# Patient Record
Sex: MALE | Race: WHITE | NOT HISPANIC OR LATINO | Employment: FULL TIME | URBAN - NONMETROPOLITAN AREA
[De-identification: names, ages, dates, MRNs, and addresses within clinical notes are randomized per-mention and may not be internally consistent; named-entity substitution may affect disease eponyms.]

---

## 2023-02-01 ENCOUNTER — OFFICE VISIT (OUTPATIENT)
Dept: FAMILY MEDICINE CLINIC | Facility: CLINIC | Age: 48
End: 2023-02-01
Payer: COMMERCIAL

## 2023-02-01 VITALS
DIASTOLIC BLOOD PRESSURE: 78 MMHG | SYSTOLIC BLOOD PRESSURE: 128 MMHG | RESPIRATION RATE: 16 BRPM | HEIGHT: 63 IN | WEIGHT: 139 LBS | HEART RATE: 72 BPM | TEMPERATURE: 98 F | BODY MASS INDEX: 24.63 KG/M2 | OXYGEN SATURATION: 98 %

## 2023-02-01 DIAGNOSIS — M50.20 HERNIATED DISC, CERVICAL: ICD-10-CM

## 2023-02-01 DIAGNOSIS — M50.30 DDD (DEGENERATIVE DISC DISEASE), CERVICAL: ICD-10-CM

## 2023-02-01 DIAGNOSIS — M25.511 CHRONIC PAIN OF BOTH SHOULDERS: Primary | ICD-10-CM

## 2023-02-01 DIAGNOSIS — M48.02 SPINAL STENOSIS, CERVICAL REGION: ICD-10-CM

## 2023-02-01 DIAGNOSIS — G89.29 CHRONIC PAIN OF BOTH SHOULDERS: Primary | ICD-10-CM

## 2023-02-01 DIAGNOSIS — Z00.00 ROUTINE ADULT HEALTH MAINTENANCE: ICD-10-CM

## 2023-02-01 DIAGNOSIS — E78.5 DYSLIPIDEMIA: ICD-10-CM

## 2023-02-01 DIAGNOSIS — Z12.5 PROSTATE CANCER SCREENING: ICD-10-CM

## 2023-02-01 DIAGNOSIS — K21.9 GASTROESOPHAGEAL REFLUX DISEASE, UNSPECIFIED WHETHER ESOPHAGITIS PRESENT: ICD-10-CM

## 2023-02-01 DIAGNOSIS — M25.512 CHRONIC PAIN OF BOTH SHOULDERS: Primary | ICD-10-CM

## 2023-02-01 DIAGNOSIS — Z80.0 FAMILY HISTORY OF ESOPHAGEAL CANCER: ICD-10-CM

## 2023-02-01 DIAGNOSIS — R20.2 PARESTHESIA OF BOTH HANDS: ICD-10-CM

## 2023-02-01 PROBLEM — F17.200 SMOKER: Status: ACTIVE | Noted: 2019-05-15

## 2023-02-01 PROBLEM — K52.9 COLITIS: Status: ACTIVE | Noted: 2019-08-02

## 2023-02-01 PROBLEM — Z80.42 FAMILY HISTORY OF PROSTATE CANCER: Status: ACTIVE | Noted: 2019-05-15

## 2023-02-01 PROBLEM — R29.818 SUSPECTED SLEEP APNEA: Status: ACTIVE | Noted: 2019-05-15

## 2023-02-01 PROBLEM — K82.4 GALLBLADDER POLYP: Status: ACTIVE | Noted: 2019-06-17

## 2023-02-01 PROCEDURE — 99204 OFFICE O/P NEW MOD 45 MIN: CPT | Performed by: PHYSICIAN ASSISTANT

## 2023-02-01 RX ORDER — OMEPRAZOLE 20 MG/1
20 CAPSULE, DELAYED RELEASE ORAL DAILY
Qty: 90 CAPSULE | Refills: 0 | Status: SHIPPED | OUTPATIENT
Start: 2023-02-01

## 2023-06-10 DIAGNOSIS — Z80.0 FAMILY HISTORY OF ESOPHAGEAL CANCER: ICD-10-CM

## 2023-06-13 RX ORDER — OMEPRAZOLE 20 MG/1
CAPSULE, DELAYED RELEASE ORAL
Qty: 30 CAPSULE | Refills: 0 | Status: SHIPPED | OUTPATIENT
Start: 2023-06-13

## 2023-06-13 NOTE — TELEPHONE ENCOUNTER
Patient never had labs as ordered to 2023.  Please ensure he has his fasting labs ASAP.  We have already sent him a uchoose message-please call the patient

## 2023-06-15 LAB
25(OH)D3+25(OH)D2 SERPL-MCNC: 25.8 NG/ML (ref 30–100)
ALBUMIN SERPL-MCNC: 4.2 G/DL (ref 3.5–5.2)
ALBUMIN/GLOB SERPL: 1.4 G/DL
ALP SERPL-CCNC: 100 U/L (ref 39–117)
ALT SERPL-CCNC: 22 U/L (ref 1–41)
APPEARANCE UR: CLEAR
AST SERPL-CCNC: 11 U/L (ref 1–40)
BACTERIA #/AREA URNS HPF: NORMAL /HPF
BASOPHILS # BLD AUTO: 0.03 10*3/MM3 (ref 0–0.2)
BASOPHILS NFR BLD AUTO: 0.3 % (ref 0–1.5)
BILIRUB SERPL-MCNC: 0.3 MG/DL (ref 0–1.2)
BILIRUB UR QL STRIP: NEGATIVE
BUN SERPL-MCNC: 10 MG/DL (ref 6–20)
BUN/CREAT SERPL: 9.6 (ref 7–25)
CALCIUM SERPL-MCNC: 10.1 MG/DL (ref 8.6–10.5)
CASTS URNS QL MICRO: NORMAL /LPF
CHLORIDE SERPL-SCNC: 103 MMOL/L (ref 98–107)
CHOLEST SERPL-MCNC: 183 MG/DL (ref 0–200)
CO2 SERPL-SCNC: 25.8 MMOL/L (ref 22–29)
COLOR UR: YELLOW
CREAT SERPL-MCNC: 1.04 MG/DL (ref 0.76–1.27)
EGFRCR SERPLBLD CKD-EPI 2021: 88.6 ML/MIN/1.73
EOSINOPHIL # BLD AUTO: 0.19 10*3/MM3 (ref 0–0.4)
EOSINOPHIL NFR BLD AUTO: 2 % (ref 0.3–6.2)
EPI CELLS #/AREA URNS HPF: NORMAL /HPF (ref 0–10)
ERYTHROCYTE [DISTWIDTH] IN BLOOD BY AUTOMATED COUNT: 12.3 % (ref 12.3–15.4)
GLOBULIN SER CALC-MCNC: 3.1 GM/DL
GLUCOSE SERPL-MCNC: 112 MG/DL (ref 65–99)
GLUCOSE UR QL STRIP: NEGATIVE
HCT VFR BLD AUTO: 45.1 % (ref 37.5–51)
HDLC SERPL-MCNC: 49 MG/DL (ref 40–60)
HGB BLD-MCNC: 15.1 G/DL (ref 13–17.7)
HGB UR QL STRIP: NEGATIVE
IMM GRANULOCYTES # BLD AUTO: 0.03 10*3/MM3 (ref 0–0.05)
IMM GRANULOCYTES NFR BLD AUTO: 0.3 % (ref 0–0.5)
KETONES UR QL STRIP: NEGATIVE
LDLC SERPL CALC-MCNC: 118 MG/DL (ref 0–100)
LDLC/HDLC SERPL: 2.37 {RATIO}
LEUKOCYTE ESTERASE UR QL STRIP: NEGATIVE
LYMPHOCYTES # BLD AUTO: 2.72 10*3/MM3 (ref 0.7–3.1)
LYMPHOCYTES NFR BLD AUTO: 27.9 % (ref 19.6–45.3)
MCH RBC QN AUTO: 30.6 PG (ref 26.6–33)
MCHC RBC AUTO-ENTMCNC: 33.5 G/DL (ref 31.5–35.7)
MCV RBC AUTO: 91.5 FL (ref 79–97)
MICRO URNS: NORMAL
MICRO URNS: NORMAL
MONOCYTES # BLD AUTO: 0.8 10*3/MM3 (ref 0.1–0.9)
MONOCYTES NFR BLD AUTO: 8.2 % (ref 5–12)
NEUTROPHILS # BLD AUTO: 5.97 10*3/MM3 (ref 1.7–7)
NEUTROPHILS NFR BLD AUTO: 61.3 % (ref 42.7–76)
NITRITE UR QL STRIP: NEGATIVE
NRBC BLD AUTO-RTO: 0 /100 WBC (ref 0–0.2)
PH UR STRIP: 6.5 [PH] (ref 5–7.5)
PLATELET # BLD AUTO: 524 10*3/MM3 (ref 140–450)
POTASSIUM SERPL-SCNC: 4.6 MMOL/L (ref 3.5–5.2)
PROT SERPL-MCNC: 7.3 G/DL (ref 6–8.5)
PROT UR QL STRIP: NEGATIVE
PSA SERPL-MCNC: 1.34 NG/ML (ref 0–4)
RBC # BLD AUTO: 4.93 10*6/MM3 (ref 4.14–5.8)
RBC #/AREA URNS HPF: NORMAL /HPF (ref 0–2)
SODIUM SERPL-SCNC: 139 MMOL/L (ref 136–145)
SP GR UR STRIP: 1.01 (ref 1–1.03)
TRIGL SERPL-MCNC: 89 MG/DL (ref 0–150)
TSH SERPL DL<=0.005 MIU/L-ACNC: 2.35 UIU/ML (ref 0.27–4.2)
URINALYSIS REFLEX: NORMAL
UROBILINOGEN UR STRIP-MCNC: 0.2 MG/DL (ref 0.2–1)
VLDLC SERPL CALC-MCNC: 16 MG/DL (ref 5–40)
WBC # BLD AUTO: 9.74 10*3/MM3 (ref 3.4–10.8)
WBC #/AREA URNS HPF: NORMAL /HPF (ref 0–5)

## 2023-08-18 ENCOUNTER — TELEPHONE (OUTPATIENT)
Dept: FAMILY MEDICINE CLINIC | Facility: CLINIC | Age: 48
End: 2023-08-18
Payer: COMMERCIAL

## 2023-08-18 DIAGNOSIS — Z80.0 FAMILY HISTORY OF ESOPHAGEAL CANCER: ICD-10-CM

## 2023-08-18 RX ORDER — OMEPRAZOLE 20 MG/1
20 CAPSULE, DELAYED RELEASE ORAL DAILY
Qty: 30 CAPSULE | Refills: 0 | Status: SHIPPED | OUTPATIENT
Start: 2023-08-18

## 2023-08-18 NOTE — TELEPHONE ENCOUNTER
Caller: tanya manzano    Relationship: Emergency Contact    Best call back number: [phone]    Requested Prescriptions:   Requested Prescriptions     Pending Prescriptions Disp Refills    omeprazole (priLOSEC) 20 MG capsule 90 capsule 0     Sig: Take 1 capsule by mouth Daily.        Pharm[facility] be sent: [id number] - [place], [place] - [facility] [address] - [phone]  - [phone] FX     Last office visit with prescribing clinician: 2/1/2023   Last telemedicine visit with prescribing clinician: Visit date not found   Next office visit with prescribing clinician: 9/12/2023     Additional details provided by patient:PATIENT COMPLETELY OUT    Does the patient have less than a 3 day supply:  [x] Yes  [] No    Would you like a call back once the refill request has been completed: [] Yes [x] No    If the office needs to give you a call back, can they leave a voicemail: [] Yes [x] No    Magnolia Curtis Rep   08/18/23 10:33 EDT

## 2023-09-12 ENCOUNTER — OFFICE VISIT (OUTPATIENT)
Dept: FAMILY MEDICINE CLINIC | Facility: CLINIC | Age: 48
End: 2023-09-12
Payer: COMMERCIAL

## 2023-09-12 VITALS
DIASTOLIC BLOOD PRESSURE: 60 MMHG | TEMPERATURE: 98.9 F | HEART RATE: 75 BPM | WEIGHT: 139.8 LBS | BODY MASS INDEX: 24.77 KG/M2 | RESPIRATION RATE: 14 BRPM | OXYGEN SATURATION: 100 % | SYSTOLIC BLOOD PRESSURE: 110 MMHG | HEIGHT: 63 IN

## 2023-09-12 DIAGNOSIS — Z80.0 FAMILY HISTORY OF ESOPHAGEAL CANCER: ICD-10-CM

## 2023-09-12 DIAGNOSIS — M25.511 CHRONIC PAIN OF BOTH SHOULDERS: ICD-10-CM

## 2023-09-12 DIAGNOSIS — R20.2 PARESTHESIA OF BOTH HANDS: ICD-10-CM

## 2023-09-12 DIAGNOSIS — M50.20 HERNIATED DISC, CERVICAL: ICD-10-CM

## 2023-09-12 DIAGNOSIS — M25.512 CHRONIC PAIN OF BOTH SHOULDERS: ICD-10-CM

## 2023-09-12 DIAGNOSIS — M50.30 DDD (DEGENERATIVE DISC DISEASE), CERVICAL: ICD-10-CM

## 2023-09-12 DIAGNOSIS — E78.2 MIXED HYPERLIPIDEMIA: ICD-10-CM

## 2023-09-12 DIAGNOSIS — R73.01 ELEVATED FASTING GLUCOSE: ICD-10-CM

## 2023-09-12 DIAGNOSIS — K21.9 GASTROESOPHAGEAL REFLUX DISEASE, UNSPECIFIED WHETHER ESOPHAGITIS PRESENT: Primary | ICD-10-CM

## 2023-09-12 DIAGNOSIS — Z11.59 ENCOUNTER FOR HEPATITIS C SCREENING TEST FOR LOW RISK PATIENT: ICD-10-CM

## 2023-09-12 DIAGNOSIS — G89.29 CHRONIC PAIN OF BOTH SHOULDERS: ICD-10-CM

## 2023-09-12 DIAGNOSIS — E55.9 VITAMIN D DEFICIENCY: ICD-10-CM

## 2023-09-12 DIAGNOSIS — M48.02 SPINAL STENOSIS, CERVICAL REGION: ICD-10-CM

## 2023-09-12 DIAGNOSIS — D75.839 THROMBOCYTOSIS: ICD-10-CM

## 2023-09-12 PROCEDURE — 99214 OFFICE O/P EST MOD 30 MIN: CPT | Performed by: PHYSICIAN ASSISTANT

## 2023-09-12 RX ORDER — OMEPRAZOLE 20 MG/1
20 CAPSULE, DELAYED RELEASE ORAL DAILY
Qty: 90 CAPSULE | Refills: 1 | Status: SHIPPED | OUTPATIENT
Start: 2023-09-12

## 2023-09-12 NOTE — PROGRESS NOTES
Subjective   Tu Parikh is a 48 y.o. male who presents today in follow up of initial visit, hyperlipidemia, elevated fasting glucose, vitamin D deficiency, thrombocytosis, GERD, family history of esophageal cancer, neck and shoulder pain.     Heartburn     Mixed hyperlipidemia- on no medication  Elevated fasting glucose- normal A1C 2/2022  Vitamin D deficiency- Taking  vitamin D- not sure the dosage- taking daily.   Thrombocytosis-   Leukocytosis- WBC increased at 11.07 2/2022.  Otherwise labs were normal.    GERD- taking Prilosec and watching spicy foods. No breakthrough symptoms  TUMS- takes couple times weekly. Has vomiting with GERD. He has burning from stomach to throat. Brother advised he may need to check. Has taken Prilosec in the past. No prescription.   History of colon polyps- Last colonoscopy- 9/5/2019- pseudopolyps/ polyps following colitis. Advised recheck 10 years.     Neck pain-   Previous PCP SIVAN Mckeon with last appointment 2/2022 for cervical radiculopathy.  She was seen for CPE and reported chronic neck pain with new onset bilateral hand numbness and tingling.  She ordered labs and advised diet and exercise.    With no improvement, he underwent MRI cervical spine 3/2022 at [facility] with degenerative disc disease-mild narrowing left C3-4 neural foramen, mild narrowing of the C4-5 neural foramen, disc herniation with spinal stenosis and narrowing of the left neural foramen at C5-6 with circumferential effacement around CSF around the cord as well as flattening and deforming anterior surface of the cord on the left and disc herniation with spinal stenosis and narrowing of the left neural foramen at C6-7.  Circumferential effacement of CSF around the cord as well as flattening in deforming of the anterior surface of the cord C6-7.  He was having shoulder pain and thought he was to have imaging on shoulders but had on neck. He never got results from the office about MRI and got a call to  schedule with neurosurgery.     Shoulder pain- has been ok.   He was having shoulder pain- intermittent- a few times weekly- happens every couple days. Anterior shoulder, shoulder joint, and superior shoulder. Previously MRI with partial rotator cuff tear and was told it would heal and no intervention necessary. Not sure if left or right shoulder- thinks left. Aching, stabbing, throbbing and burning pain- ranges about 4/10. Abduction worsens. Sometimes prevents from sleeping. Still intermittent numbness and tingling in bilateral hands. No pain, numbness, or tingling in arms. Not dropping things and no weakness.    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     Has been healthy.   Father with cancer- Prostate and thyroid cancer.   Brother with cancer- esophageal cancer from untreated GERD- Dx at 54 years- dx a couple months ago and went through radiation and is in a trial. He could have been on chemo as well.   PGM-  of lung cancer  PGF- no cancer.   Mother- healthy.   MGF-  of emphysema and thinks lung cancer  MGM-  of old age.      The following portions of the patient's history were reviewed and updated as appropriate: allergies, current medications, past family history, past medical history, past social history, past surgical history and problem list.    Review of Systems    Objective   Vitals:    23 0816   BP: 110/60   Pulse: 75   Resp: 14   Temp: 98.9 °F (37.2 °C)   SpO2: 100%     Body mass index is 24.77 kg/m².    Physical Exam  Vitals and nursing note reviewed.   Constitutional:       General: He is not in acute distress.     Appearance: Normal appearance. He is well-developed.   HENT:      Head: Normocephalic and atraumatic.      Right Ear: External ear normal.      Left Ear: External ear normal.   Eyes:      Conjunctiva/sclera: Conjunctivae normal.   Neck:      Thyroid: No thyroid mass or thyromegaly.      Vascular: No carotid bruit.      Trachea: No tracheal deviation.    Cardiovascular:      Rate and Rhythm: Normal rate and regular rhythm.      Heart sounds: Normal heart sounds.   Pulmonary:      Effort: Pulmonary effort is normal.      Breath sounds: Normal breath sounds.   Musculoskeletal:      Right shoulder: No tenderness. Normal range of motion. Normal strength. Normal pulse.      Left shoulder: No tenderness. Normal range of motion. Normal strength. Normal pulse.      Right upper arm: No edema or tenderness.      Left upper arm: No edema or tenderness.      Right elbow: No swelling or effusion. Normal range of motion. No tenderness.      Left elbow: No swelling or effusion. Normal range of motion. No tenderness.      Right forearm: No swelling, deformity or tenderness.      Left forearm: No swelling, deformity or tenderness.      Right hand: Normal strength. Normal sensation.      Left hand: Normal strength. Normal sensation.      Cervical back: No deformity, lacerations, spasms or tenderness.   Skin:     General: Skin is warm and dry.   Neurological:      Mental Status: He is alert and oriented to person, place, and time.      Sensory: No sensory deficit.      Motor: No atrophy or abnormal muscle tone.      Gait: Gait normal.      Deep Tendon Reflexes:      Reflex Scores:       Bicep reflexes are 2+ on the right side and 2+ on the left side.       Brachioradialis reflexes are 2+ on the right side and 2+ on the left side.  Psychiatric:         Speech: Speech normal.         Behavior: Behavior normal.         Thought Content: Thought content normal.         Judgment: Judgment normal.       Assessment & Plan   Diagnoses and all orders for this visit:    1. Gastroesophageal reflux disease, unspecified whether esophagitis present (Primary)  -     omeprazole (priLOSEC) 20 MG capsule; Take 1 capsule by mouth Daily.  Dispense: 90 capsule; Refill: 1    2. Family history of esophageal cancer  -     omeprazole (priLOSEC) 20 MG capsule; Take 1 capsule by mouth Daily.  Dispense: 90  capsule; Refill: 1    3. Mixed hyperlipidemia  -     Comprehensive Metabolic Panel  -     Lipid Panel With LDL / HDL Ratio    4. Vitamin D deficiency  -     Comprehensive Metabolic Panel  -     Vitamin D,25-Hydroxy    5. Thrombocytosis  -     CBC & Differential    6. Elevated fasting glucose  -     Comprehensive Metabolic Panel  -     Hemoglobin A1c    7. Chronic pain of both shoulders  -     ASA With / DsDNA, RNP, Sjogrens A / B, Smith  -     C-reactive Protein  -     Cyclic Citrul Peptide Antibody, IgG / IgA  -     Rheumatoid Factor  -     Sedimentation Rate  -     Uric Acid    8. DDD (degenerative disc disease), cervical  -     ASA With / DsDNA, RNP, Sjogrens A / B, Smith  -     C-reactive Protein  -     Cyclic Citrul Peptide Antibody, IgG / IgA  -     Rheumatoid Factor  -     Sedimentation Rate  -     Uric Acid    9. Herniated disc, cervical  -     ASA With / DsDNA, RNP, Sjogrens A / B, Smith  -     C-reactive Protein  -     Cyclic Citrul Peptide Antibody, IgG / IgA  -     Rheumatoid Factor  -     Sedimentation Rate  -     Uric Acid    10. Spinal stenosis, cervical region  -     ASA With / DsDNA, RNP, Sjogrens A / B, Smith  -     C-reactive Protein  -     Cyclic Citrul Peptide Antibody, IgG / IgA  -     Rheumatoid Factor  -     Sedimentation Rate  -     Uric Acid    11. Paresthesia of both hands  -     ASA With / DsDNA, RNP, Sjogrens A / B, Smith  -     C-reactive Protein  -     Cyclic Citrul Peptide Antibody, IgG / IgA  -     Rheumatoid Factor  -     Sedimentation Rate  -     Uric Acid    12. Encounter for hepatitis C screening test for low risk patient  -     Hepatitis C antibody        Assessment and Plan  Patient will have fasting labs. Call if no results in 1 week. Stability of conditions, plan, follow up, and further recommendations pending labs.       Mixed hyperlipidemia- Await lab results for further recommendations.   Elevated fasting glucose- I will continue to monitor A1C annually.   Vitamin D  deficiency- Dosing recommendations pending labs and his current dosage.   Thrombocytosis- I will monitor and refer to specialist if abnormal.   Leukocytosis- I will continue to monitor and refer to specialist if persistently abnormal.   GERD, family history of esophageal cancer in brother, history of colon polyps- I will continue Prilosec 20 mg daily. I referred to GI for evaluation and consideration of EGD.  Bilateral shoulder pain- Patient with chronic bilateral shoulder pain, worse with abduction. He was told he had rotator cuff pathology in the past. I will refer to sports medicine for evaluation, imaging as needed, and treatment.   Cervical DDD, disc herniation, spinal stenosis- He was seen by previous PCP for shoulder pain and was thought to have cervical radiculopathy. He was referred to spine specialist. However, he did not go since he was not contacted about the results to determine treatment and was not having neck symptoms or interest in surgery. I have reviewed MRI. I will have him see sports medicine for review and recommendations.   Paresthesias, hands- Symptoms sound more like carpal tunnel syndrome. I asked that he touch his hands when numbness for better determination of etiology and treatment. He has no other radicular numbness or tingling in arms, so I have less concern for radiculopathy. Pending sports medicine evaluation.      I spent 35 minutes caring for Tu Parikh on this date of service. This time includes time spent by me in the following activities as necessary: preparing for the visit, reviewing tests, specialists records and previous visits, obtaining and/or reviewing a separately obtained history, performing a medically appropriate exam and/or evaluation, counseling and educating the patient, family, caregiver, referring and/or communicating with other healthcare professionals, documenting information in the medical record, independently interpreting results and communicating that  information with the patient, family, caregiver, and developing a medically appropriate treatment plan with consideration of other conditions, medications, and treatments.        Answers submitted by the patient for this visit:  Primary Reason for Visit (Submitted on 9/11/2023)  What is the primary reason for your visit?: Other  Other (Submitted on 9/11/2023)  Please describe your symptoms.: Medication refill and follow up to last appt.  Also followup blood work.  Have you had these symptoms before?: No  How long have you been having these symptoms?: Greater than 2 weeks  Please list any medications you are currently taking for this condition.: Omeprazole  Please describe any probable cause for these symptoms. : Heartburnn

## 2023-09-13 LAB
25(OH)D3+25(OH)D2 SERPL-MCNC: 48.5 NG/ML (ref 30–100)
ALBUMIN SERPL-MCNC: 4.7 G/DL (ref 3.5–5.2)
ALBUMIN/GLOB SERPL: 1.6 G/DL
ALP SERPL-CCNC: 97 U/L (ref 39–117)
ALT SERPL-CCNC: 16 U/L (ref 1–41)
ANA SER QL: NEGATIVE
AST SERPL-CCNC: 13 U/L (ref 1–40)
BASOPHILS # BLD AUTO: 0.03 10*3/MM3 (ref 0–0.2)
BASOPHILS NFR BLD AUTO: 0.3 % (ref 0–1.5)
BILIRUB SERPL-MCNC: 0.4 MG/DL (ref 0–1.2)
BUN SERPL-MCNC: 10 MG/DL (ref 6–20)
BUN/CREAT SERPL: 9.5 (ref 7–25)
CALCIUM SERPL-MCNC: 10.4 MG/DL (ref 8.6–10.5)
CCP IGA+IGG SERPL IA-ACNC: 0 UNITS (ref 0–19)
CHLORIDE SERPL-SCNC: 100 MMOL/L (ref 98–107)
CHOLEST SERPL-MCNC: 163 MG/DL (ref 0–200)
CO2 SERPL-SCNC: 22.3 MMOL/L (ref 22–29)
CREAT SERPL-MCNC: 1.05 MG/DL (ref 0.76–1.27)
CRP SERPL-MCNC: 0.67 MG/DL (ref 0–0.5)
EGFRCR SERPLBLD CKD-EPI 2021: 87 ML/MIN/1.73
EOSINOPHIL # BLD AUTO: 0.13 10*3/MM3 (ref 0–0.4)
EOSINOPHIL NFR BLD AUTO: 1.2 % (ref 0.3–6.2)
ERYTHROCYTE [DISTWIDTH] IN BLOOD BY AUTOMATED COUNT: 13.2 % (ref 12.3–15.4)
ERYTHROCYTE [SEDIMENTATION RATE] IN BLOOD BY WESTERGREN METHOD: 6 MM/HR (ref 0–15)
GLOBULIN SER CALC-MCNC: 2.9 GM/DL
GLUCOSE SERPL-MCNC: 102 MG/DL (ref 65–99)
HBA1C MFR BLD: 5.6 % (ref 4.8–5.6)
HCT VFR BLD AUTO: 46.4 % (ref 37.5–51)
HCV IGG SERPL QL IA: NON REACTIVE
HDLC SERPL-MCNC: 47 MG/DL (ref 40–60)
HGB BLD-MCNC: 16 G/DL (ref 13–17.7)
IMM GRANULOCYTES # BLD AUTO: 0.04 10*3/MM3 (ref 0–0.05)
IMM GRANULOCYTES NFR BLD AUTO: 0.4 % (ref 0–0.5)
LDLC SERPL CALC-MCNC: 97 MG/DL (ref 0–100)
LDLC/HDLC SERPL: 2.03 {RATIO}
LYMPHOCYTES # BLD AUTO: 2.16 10*3/MM3 (ref 0.7–3.1)
LYMPHOCYTES NFR BLD AUTO: 19.9 % (ref 19.6–45.3)
MCH RBC QN AUTO: 31.1 PG (ref 26.6–33)
MCHC RBC AUTO-ENTMCNC: 34.5 G/DL (ref 31.5–35.7)
MCV RBC AUTO: 90.3 FL (ref 79–97)
MONOCYTES # BLD AUTO: 1.02 10*3/MM3 (ref 0.1–0.9)
MONOCYTES NFR BLD AUTO: 9.4 % (ref 5–12)
NEUTROPHILS # BLD AUTO: 7.5 10*3/MM3 (ref 1.7–7)
NEUTROPHILS NFR BLD AUTO: 68.8 % (ref 42.7–76)
NRBC BLD AUTO-RTO: 0 /100 WBC (ref 0–0.2)
PLATELET # BLD AUTO: 525 10*3/MM3 (ref 140–450)
POTASSIUM SERPL-SCNC: 5.1 MMOL/L (ref 3.5–5.2)
PROT SERPL-MCNC: 7.6 G/DL (ref 6–8.5)
RBC # BLD AUTO: 5.14 10*6/MM3 (ref 4.14–5.8)
RHEUMATOID FACT SERPL-ACNC: <10 IU/ML
SODIUM SERPL-SCNC: 140 MMOL/L (ref 136–145)
TRIGL SERPL-MCNC: 104 MG/DL (ref 0–150)
URATE SERPL-MCNC: 5 MG/DL (ref 3.4–7)
VLDLC SERPL CALC-MCNC: 19 MG/DL (ref 5–40)
WBC # BLD AUTO: 10.88 10*3/MM3 (ref 3.4–10.8)

## 2023-09-26 DIAGNOSIS — D75.839 THROMBOCYTOSIS: Primary | ICD-10-CM

## 2023-09-26 DIAGNOSIS — D72.829 LEUKOCYTOSIS, UNSPECIFIED TYPE: ICD-10-CM

## 2023-10-04 LAB
BASOPHILS # BLD AUTO: 0.03 10*3/MM3 (ref 0–0.2)
BASOPHILS NFR BLD AUTO: 0.3 % (ref 0–1.5)
EOSINOPHIL # BLD AUTO: 0.19 10*3/MM3 (ref 0–0.4)
EOSINOPHIL NFR BLD AUTO: 2 % (ref 0.3–6.2)
ERYTHROCYTE [DISTWIDTH] IN BLOOD BY AUTOMATED COUNT: 13.5 % (ref 12.3–15.4)
HCT VFR BLD AUTO: 42.8 % (ref 37.5–51)
HGB BLD-MCNC: 14.1 G/DL (ref 13–17.7)
IMM GRANULOCYTES # BLD AUTO: 0.03 10*3/MM3 (ref 0–0.05)
IMM GRANULOCYTES NFR BLD AUTO: 0.3 % (ref 0–0.5)
LYMPHOCYTES # BLD AUTO: 2.49 10*3/MM3 (ref 0.7–3.1)
LYMPHOCYTES NFR BLD AUTO: 26.8 % (ref 19.6–45.3)
MCH RBC QN AUTO: 30.3 PG (ref 26.6–33)
MCHC RBC AUTO-ENTMCNC: 32.9 G/DL (ref 31.5–35.7)
MCV RBC AUTO: 91.8 FL (ref 79–97)
MONOCYTES # BLD AUTO: 0.98 10*3/MM3 (ref 0.1–0.9)
MONOCYTES NFR BLD AUTO: 10.5 % (ref 5–12)
NEUTROPHILS # BLD AUTO: 5.58 10*3/MM3 (ref 1.7–7)
NEUTROPHILS NFR BLD AUTO: 60.1 % (ref 42.7–76)
NRBC BLD AUTO-RTO: 0 /100 WBC (ref 0–0.2)
PLATELET # BLD AUTO: 433 10*3/MM3 (ref 140–450)
RBC # BLD AUTO: 4.66 10*6/MM3 (ref 4.14–5.8)
WBC # BLD AUTO: 9.3 10*3/MM3 (ref 3.4–10.8)

## 2023-12-18 ENCOUNTER — TELEPHONE (OUTPATIENT)
Dept: FAMILY MEDICINE CLINIC | Facility: CLINIC | Age: 48
End: 2023-12-18

## 2023-12-19 NOTE — TELEPHONE ENCOUNTER
Caller: TC    Relationship: Other    Best call back number: [phone]    What was the call regarding: TC CALLED AND STATED THAT THE PATIENT HAD LABS DONE AND THEY WERE CODED AS SCREENING. THIS IS NOT PAYABLE AS SCREENING FOR INSURANCE. TC WANTED TO KNOW IF A DIAGNOSIS CODE COULD BE ADDED ON THE LABS SO THAT THIS WOULD BE PAYABLE. IF SO, PLEASE UPDATE [facility] AND ADVISE PATIENT.   
"I looked over patients labs and the only lab that has \"Screening for\" on the diagnosis is the Hep C test. Please advise another diagnosis code we could use. "
For hepatitis C screening, that is the only diagnosis we have.  Per guidelines, it is indicated to screen all adults over the age of 18.  The only other diagnosis I would be able to think of would be abnormal liver function test, as there was 1 time that there was an abnormal liver function test on previous labs.  
I called lab [facility] and added a New diagnosis code to patients labs that he had done in September.   
No

## 2024-05-16 DIAGNOSIS — K21.9 GASTROESOPHAGEAL REFLUX DISEASE, UNSPECIFIED WHETHER ESOPHAGITIS PRESENT: ICD-10-CM

## 2024-05-16 DIAGNOSIS — Z80.0 FAMILY HISTORY OF ESOPHAGEAL CANCER: ICD-10-CM

## 2024-05-16 RX ORDER — OMEPRAZOLE 20 MG/1
20 CAPSULE, DELAYED RELEASE ORAL DAILY
Qty: 30 CAPSULE | Refills: 0 | Status: SHIPPED | OUTPATIENT
Start: 2024-05-16

## 2024-09-20 DIAGNOSIS — K21.9 GASTROESOPHAGEAL REFLUX DISEASE, UNSPECIFIED WHETHER ESOPHAGITIS PRESENT: ICD-10-CM

## 2024-09-20 DIAGNOSIS — Z80.0 FAMILY HISTORY OF ESOPHAGEAL CANCER: ICD-10-CM

## 2024-10-30 NOTE — PROGRESS NOTES
Patient returning phone call for results, pt was notified of Dr Pérez's recommendations, pt verbalized understanding. Pt was inquiring about if she has to get the rubella vaccine Post partum, pt was notified that it is suggested that she does get it postpartum, but if she doesn't want it, that is up to her discretion, Pt verbalized understanding, no further questions at this time      Subjective   Tu Parikh is a 47 y.o. male who presents today as a new patient to establish care and for evaluation of shoulder pain.     History of Present Illness     Previous PCP SIVAN Mckeon with last appointment 2/2022 for cervical radiculopathy.  She was seen for CPE and reported chronic neck pain with new onset bilateral hand numbness and tingling.  She ordered labs and advised diet and exercise.    With no improvement, he underwent MRI cervical spine 3/2022 at [facility] with degenerative disc disease-mild narrowing left C3-4 neural foramen, mild narrowing of the C4-5 neural foramen, disc herniation with spinal stenosis and narrowing of the left neural foramen at C5-6 with circumferential effacement around CSF around the cord as well as flattening and deforming anterior surface of the cord on the left and disc herniation with spinal stenosis and narrowing of the left neural foramen at C6-7.  Circumferential effacement of CSF around the cord as well as flattening in deforming of the anterior surface of the cord C6-7.  He was having shoulder pain and thought he was to have imaging on shoulders but had on neck. He never got results from the office about MRI and got a call to schedule with neurosurgery.   WBC increased at 11.07 2/2022.  Otherwise labs were normal.    Still having shoulder pain- intermittent- a few times weekly- happens every couple days. Anterior shoulder, shoulder joint, and superior shoulder. Previously MRI with partial rotator cuff tear and was told it would heal and no intervention necessary. Not sure if left or right shoulder- thinks left. Aching, stabbing, throbbing and burning pain- ranges about 4/10. Abduction worsens. Sometimes prevents from sleeping. Still intermittent numbness and tingling in bilateral hands. No pain, numbness, or tingling in arms. Not dropping things and no weakness.    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     Last colonoscopy- 9/5/2019-  pseudopolyps/ polyps following colitis. Advised recheck 10 years.   GERD- TUMS- takes couple times weekly. Has vomiting with GERD. He has burning from stomach to throat. Brother advised he may need to check. Has taken Prilosec in the past. No prescription.     Has been healthy.   Father with cancer- Prostate and thyroid cancer.   Brother with cancer- esophageal cancer from untreated GERD- Dx at 54 years- dx a couple months ago and went through radiation and is in a trial. He could have been on chemo as well.   PGM-  of lung cancer  PGF- no cancer.   Mother- healthy.   MGF-  of emphysema and thinks lung cancer  MGM-  of old age.     Past Medical History:   Diagnosis Date   • Cervical radiculopathy      Past Surgical History:   Procedure Laterality Date   • EYE SURGERY       Social History     Socioeconomic History   • Marital status:    Tobacco Use   • Smoking status: Every Day     Packs/day: 1.00     Years: 15.00     Pack years: 15.00     Types: Cigarettes     Start date:      Passive exposure: Current   • Smokeless tobacco: Never   Vaping Use   • Vaping Use: Never used   Substance and Sexual Activity   • Alcohol use: Yes     Alcohol/week: 2.0 standard drinks     Types: 2 Cans of beer per week     Comment: daily   • Drug use: Never   • Sexual activity: Yes      Family History   Problem Relation Age of Onset   • No Known Problems Mother    • Cancer Father    • Cancer Brother         The following portions of the patient's history were reviewed and updated as appropriate: allergies, current medications, past family history, past medical history, past social history, past surgical history and problem list.    Review of Systems    Objective   Vitals:    23 1347   BP: 128/78   Pulse: 72   Resp: 16   Temp: 98 °F (36.7 °C)   SpO2: 98%     Body mass index is 24.62 kg/m².    Physical Exam  Vitals and nursing note reviewed.   Constitutional:       General: He is not in acute distress.      Appearance: Normal appearance. He is well-developed.   HENT:      Head: Normocephalic and atraumatic.      Right Ear: External ear normal.      Left Ear: External ear normal.   Eyes:      Conjunctiva/sclera: Conjunctivae normal.   Neck:      Thyroid: No thyroid mass or thyromegaly.      Vascular: No carotid bruit.      Trachea: No tracheal deviation.   Cardiovascular:      Rate and Rhythm: Normal rate and regular rhythm.      Heart sounds: Normal heart sounds.   Pulmonary:      Effort: Pulmonary effort is normal.      Breath sounds: Normal breath sounds.   Musculoskeletal:      Right shoulder: No tenderness. Normal range of motion. Normal strength. Normal pulse.      Left shoulder: No tenderness. Normal range of motion. Normal strength. Normal pulse.      Right upper arm: No edema or tenderness.      Left upper arm: No edema or tenderness.      Right elbow: No swelling or effusion. Normal range of motion. No tenderness.      Left elbow: No swelling or effusion. Normal range of motion. No tenderness.      Right forearm: No swelling, deformity or tenderness.      Left forearm: No swelling, deformity or tenderness.      Right hand: Normal strength. Normal sensation.      Left hand: Normal strength. Normal sensation.      Cervical back: No deformity, lacerations, spasms or tenderness.   Skin:     General: Skin is warm and dry.   Neurological:      Mental Status: He is alert and oriented to person, place, and time.      Sensory: No sensory deficit.      Motor: No atrophy or abnormal muscle tone.      Gait: Gait normal.      Deep Tendon Reflexes:      Reflex Scores:       Bicep reflexes are 2+ on the right side and 2+ on the left side.       Brachioradialis reflexes are 2+ on the right side and 2+ on the left side.  Psychiatric:         Speech: Speech normal.         Behavior: Behavior normal.         Thought Content: Thought content normal.         Judgment: Judgment normal.         Assessment & Plan   Diagnoses and all  orders for this visit:    1. Chronic pain of both shoulders (Primary)  -     Ambulatory Referral to Sports Medicine  -     CBC & Differential  -     Comprehensive Metabolic Panel  -     TSH    2. DDD (degenerative disc disease), cervical  -     Ambulatory Referral to Sports Medicine  -     CBC & Differential  -     Comprehensive Metabolic Panel  -     TSH    3. Herniated disc, cervical  -     Ambulatory Referral to Sports Medicine    4. Spinal stenosis, cervical region  -     Ambulatory Referral to Sports Medicine    5. Paresthesia of both hands  -     Ambulatory Referral to Sports Medicine  -     CBC & Differential  -     Comprehensive Metabolic Panel  -     TSH  -     Urinalysis With Culture If Indicated -    6. Gastroesophageal reflux disease, unspecified whether esophagitis present  -     CBC & Differential  -     Comprehensive Metabolic Panel  -     Vitamin D,25-Hydroxy    7. Family history of esophageal cancer  -     Ambulatory Referral to Gastroenterology  -     omeprazole (priLOSEC) 20 MG capsule; Take 1 capsule by mouth Daily.  Dispense: 90 capsule; Refill: 0  -     CBC & Differential  -     Comprehensive Metabolic Panel  -     Vitamin D,25-Hydroxy    8. Routine adult health maintenance  -     CBC & Differential  -     Comprehensive Metabolic Panel  -     Lipid Panel With LDL / HDL Ratio  -     TSH  -     Urinalysis With Culture If Indicated -    9. Dyslipidemia  -     Comprehensive Metabolic Panel  -     Lipid Panel With LDL / HDL Ratio    10. Prostate cancer screening  -     PSA Screen        Assessment and Plan  Patient will have fasting labs. Call if no results in 1 week. Stability of conditions, plan, follow up, and further recommendations pending labs.    · Bilateral shoulder pain- Patient with chronic bilateral shoulder pain, worse with abduction. He was told he had rotator cuff pathology in the past. I will refer to sports medicine for evaluation, imaging as needed, and treatment.   · Cervical DDD,  disc herniation, spinal stenosis- He was seen by previous PCP for shoulder pain and was thought to have cervical radiculopathy. He was referred to spine specialist. However, he did not go since he was not contacted about the results to determine treatment and was not having neck symptoms or interest in surgery. I have reviewed MRI. I will have him see sports medicine for review and recommendations.   · Paresthesias, hands- Symptoms sound more like carpal tunnel syndrome. I asked that he touch his hands when numbness for better determination of etiology and treatment. He has no other radicular numbness or tingling in arms, so I have less concern for radiculopathy. Pending sports medicine evaluation.   · GERD, family history of esophageal cancer in brother, history of colon polyps- I will continue Prilosec 20 mg daily. I will also refer to GI for evaluation and consideration of EGD.     I spent 35 minutes caring for Tu Parikh on this date of service. This time includes time spent by me in the following activities as necessary: preparing for the visit, reviewing tests, specialists records and previous visits, obtaining and/or reviewing a separately obtained history, performing a medically appropriate exam and/or evaluation, counseling and educating the patient, family, caregiver, referring and/or communicating with other healthcare professionals, documenting information in the medical record, independently interpreting results and communicating that information with the patient, family, caregiver, and developing a medically appropriate treatment plan with consideration of other conditions, medications, and treatments.

## 2024-12-18 ENCOUNTER — OFFICE VISIT (OUTPATIENT)
Dept: FAMILY MEDICINE CLINIC | Facility: CLINIC | Age: 49
End: 2024-12-18
Payer: COMMERCIAL

## 2024-12-18 ENCOUNTER — PATIENT ROUNDING (BHMG ONLY) (OUTPATIENT)
Dept: FAMILY MEDICINE CLINIC | Facility: CLINIC | Age: 49
End: 2024-12-18
Payer: COMMERCIAL

## 2024-12-18 VITALS
DIASTOLIC BLOOD PRESSURE: 76 MMHG | WEIGHT: 164 LBS | HEIGHT: 63 IN | SYSTOLIC BLOOD PRESSURE: 124 MMHG | RESPIRATION RATE: 16 BRPM | BODY MASS INDEX: 29.06 KG/M2 | OXYGEN SATURATION: 97 % | HEART RATE: 71 BPM | TEMPERATURE: 98.1 F

## 2024-12-18 DIAGNOSIS — R79.82 ELEVATED C-REACTIVE PROTEIN (CRP): ICD-10-CM

## 2024-12-18 DIAGNOSIS — R73.01 ELEVATED FASTING GLUCOSE: ICD-10-CM

## 2024-12-18 DIAGNOSIS — D75.839 THROMBOCYTOSIS: ICD-10-CM

## 2024-12-18 DIAGNOSIS — G89.29 CHRONIC PAIN OF BOTH SHOULDERS: ICD-10-CM

## 2024-12-18 DIAGNOSIS — M25.511 CHRONIC PAIN OF BOTH SHOULDERS: ICD-10-CM

## 2024-12-18 DIAGNOSIS — Z80.0 FAMILY HISTORY OF ESOPHAGEAL CANCER: ICD-10-CM

## 2024-12-18 DIAGNOSIS — M25.512 CHRONIC PAIN OF BOTH SHOULDERS: ICD-10-CM

## 2024-12-18 DIAGNOSIS — R20.2 PARESTHESIA OF BOTH HANDS: ICD-10-CM

## 2024-12-18 DIAGNOSIS — M48.02 SPINAL STENOSIS, CERVICAL REGION: ICD-10-CM

## 2024-12-18 DIAGNOSIS — Z12.5 PROSTATE CANCER SCREENING: ICD-10-CM

## 2024-12-18 DIAGNOSIS — E78.2 MIXED HYPERLIPIDEMIA: ICD-10-CM

## 2024-12-18 DIAGNOSIS — E55.9 VITAMIN D DEFICIENCY: ICD-10-CM

## 2024-12-18 DIAGNOSIS — M50.20 HERNIATED DISC, CERVICAL: ICD-10-CM

## 2024-12-18 DIAGNOSIS — K21.9 GASTROESOPHAGEAL REFLUX DISEASE, UNSPECIFIED WHETHER ESOPHAGITIS PRESENT: ICD-10-CM

## 2024-12-18 DIAGNOSIS — Z00.00 ROUTINE ADULT HEALTH MAINTENANCE: Primary | ICD-10-CM

## 2024-12-18 DIAGNOSIS — M50.30 DDD (DEGENERATIVE DISC DISEASE), CERVICAL: ICD-10-CM

## 2024-12-18 DIAGNOSIS — D72.829 LEUKOCYTOSIS, UNSPECIFIED TYPE: ICD-10-CM

## 2024-12-18 PROCEDURE — 99214 OFFICE O/P EST MOD 30 MIN: CPT | Performed by: PHYSICIAN ASSISTANT

## 2024-12-18 PROCEDURE — 99396 PREV VISIT EST AGE 40-64: CPT | Performed by: PHYSICIAN ASSISTANT

## 2024-12-18 NOTE — PROGRESS NOTES
Subjective   Tu Parikh is a 49 y.o. male who presents today for CPE.     History of Present Illness     Last colonoscopy- Dr Osman- 2019- polyp x 2- hyperplastic and resolving colitis. Recheck 10 years.   Last Tdap- thinks < 10 years for work.   Prevnar- has not had  Pneumovax- has not had  Hepatitis A- has not had- declines  Last flu shot- has not had- declines  Shingrix- unsure if he had varicella as a child.  Covid 19- has not had- declines    Father with cancer- Prostate and thyroid cancer.   Brother with cancer- esophageal cancer from untreated GERD- Dx at 54 years- dx a couple months ago and went through radiation and is in a trial. He could have been on chemo as well. Now has cancer but not sure the type.   PGM-  of lung cancer  PGF- no cancer.   Mother- healthy.   MGF-  of emphysema and thinks lung cancer  MGM-  of old age.      Past Medical History:   Diagnosis Date    Cervical radiculopathy      Past Surgical History:   Procedure Laterality Date    EYE SURGERY       Social History     Socioeconomic History    Marital status:    Tobacco Use    Smoking status: Former     Current packs/day: 0.00     Average packs/day: 2.0 packs/day for 35.5 years (71.0 ttl pk-yrs)     Types: Cigarettes     Start date:      Quit date: 2023     Years since quittin.4     Passive exposure: Current    Smokeless tobacco: Never   Vaping Use    Vaping status: Never Used   Substance and Sexual Activity    Alcohol use: Yes     Alcohol/week: 2.0 standard drinks of alcohol     Types: 2 Cans of beer per week     Comment: daily    Drug use: Never    Sexual activity: Yes      Family History   Problem Relation Age of Onset    No Known Problems Mother     Cancer Father     Cancer Brother         The following portions of the patient's history were reviewed and updated as appropriate: allergies, current medications, past family history, past medical history, past social history, past surgical history  and problem list.    Review of Systems    Objective   Vitals:    12/18/24 1256   BP: 124/76   Pulse: 71   Resp: 16   Temp: 98.1 °F (36.7 °C)   SpO2: 97%     Body mass index is 29.06 kg/m².     Physical Exam  Vitals and nursing note reviewed.   Constitutional:       General: He is not in acute distress.     Appearance: He is well-developed. He is not diaphoretic.   HENT:      Head: Normocephalic and atraumatic.      Right Ear: Hearing, tympanic membrane, ear canal and external ear normal.      Left Ear: Hearing, tympanic membrane, ear canal and external ear normal.      Nose: Nose normal.   Eyes:      General: Lids are normal.      Conjunctiva/sclera: Conjunctivae normal.      Pupils: Pupils are equal, round, and reactive to light.   Neck:      Thyroid: No thyroid mass or thyromegaly.      Vascular: No carotid bruit or JVD.      Trachea: No tracheal deviation.   Cardiovascular:      Rate and Rhythm: Normal rate and regular rhythm.      Pulses:           Radial pulses are 2+ on the right side and 2+ on the left side.        Posterior tibial pulses are 2+ on the right side and 2+ on the left side.      Heart sounds: Normal heart sounds. No murmur heard.     No friction rub. No gallop.   Pulmonary:      Effort: Pulmonary effort is normal. No respiratory distress.      Breath sounds: Normal breath sounds. No wheezing, rhonchi or rales.   Abdominal:      General: Bowel sounds are normal. There is no distension or abdominal bruit.      Palpations: Abdomen is soft. Abdomen is not rigid.      Tenderness: There is no abdominal tenderness. There is no guarding or rebound.      Hernia: No hernia is present.   Musculoskeletal:         General: No tenderness or deformity. Normal range of motion.      Cervical back: Neck supple.      Comments: Normal strength.   Lymphadenopathy:      Cervical: No cervical adenopathy.   Skin:     General: Skin is warm and dry.      Findings: No erythema or rash.   Neurological:      Mental Status:  He is alert and oriented to person, place, and time.      Cranial Nerves: No cranial nerve deficit.      Sensory: No sensory deficit.      Motor: No abnormal muscle tone.      Coordination: Coordination normal.      Gait: Gait normal.      Deep Tendon Reflexes: Reflexes are normal and symmetric. Reflexes normal.   Psychiatric:         Speech: Speech normal.         Behavior: Behavior normal.         Thought Content: Thought content normal.         Assessment & Plan   Diagnoses and all orders for this visit:    1. Routine adult health maintenance (Primary)    2. Prostate cancer screening  -     PSA Screen    3. Mixed hyperlipidemia  -     Comprehensive Metabolic Panel  -     CK  -     Lipid Panel With LDL / HDL Ratio    4. Elevated fasting glucose  -     Comprehensive Metabolic Panel  -     Hemoglobin A1c  -     TSH  -     Urinalysis With Culture If Indicated -    5. Vitamin D deficiency  -     Comprehensive Metabolic Panel  -     Vitamin D,25-Hydroxy    6. Thrombocytosis  -     CBC & Differential    7. Leukocytosis, unspecified type  -     CBC & Differential  -     C-reactive Protein    8. Elevated C-reactive protein (CRP)    9. Gastroesophageal reflux disease, unspecified whether esophagitis present    10. Family history of esophageal cancer    11. DDD (degenerative disc disease), cervical    12. Herniated disc, cervical    13. Spinal stenosis, cervical region    14. Paresthesia of both hands    15. Chronic pain of both shoulders        Assessment and Plan  CPE completed today.  Patient is up to date on colonoscopy until 2029.  He is not up-to-date on vaccinations.  He should find out the date of his last tetanus to ensure less than 10 years.  He can call with the date for us to update vaccination record.  Patient to consider Shingrix, flu, and COVID vaccinations.  Preventative counseling to ensure healthy lifestyle, updated health maintenance, and continued follow-up as directed.

## 2024-12-18 NOTE — PROGRESS NOTES
"My name is Moe Lamas     I am the Practice Manager with   [facility] [address] [address] [phone]      I am messaging to ask you about our practice and your recent visit.     Tell me about your visit with us. What things went well?         We're always looking for ways to make our patients' experiences even better. Do you have recommendations on ways we may improve?       Overall were you satisfied with your first visit to our practice?        Is there anything else I can do for you?     Also, please note that you may receive a survey from \"[employer]\" please take time to fill that out, as it provides important feedback for our office.       Thank you, and have a great day.   "

## 2024-12-18 NOTE — PROGRESS NOTES
Subjective   Tu Parikh is a 49 y.o. male who presents today in follow up of hyperlipidemia, elevated fasting glucose, vitamin D deficiency, thrombocytosis, GERD, family history of esophageal cancer, neck and shoulder pain.     Heartburn       Mixed hyperlipidemia- on no medication  Elevated fasting glucose- normal A1C 2/2022.  A1c was up to 5.6% 9/2023.  Vitamin D deficiency- Taking  vitamin D- not sure the dosage- taking daily.   Thrombocytosis- no clots.   Leukocytosis- WBC increased at 11.07 2/2022.  Otherwise labs were normal. No signs of illness. No smoking- stopped about 1 year ago.   Elevated CRP-elevated CRP 9/2023 with otherwise negative autoimmune testing.    GERD- taking Prilosec and watching spicy foods. No breakthrough symptoms  TUMS- takes couple times weekly. Has vomiting with GERD. He has burning from stomach to throat. Brother advised he may need to check. Has taken Prilosec in the past. No prescription.   History of colon polyps- Last colonoscopy- 9/5/2019- pseudopolyps/ polyps following colitis. Advised recheck 10 years.     Neck pain- still has. No change.   Previous PCP SIVAN Mckeon with last appointment 2/2022 for cervical radiculopathy.  She was seen for CPE and reported chronic neck pain with new onset bilateral hand numbness and tingling.  She ordered labs and advised diet and exercise.    With no improvement, he underwent MRI cervical spine 3/2022 at [facility] with degenerative disc disease-mild narrowing left C3-4 neural foramen, mild narrowing of the C4-5 neural foramen, disc herniation with spinal stenosis and narrowing of the left neural foramen at C5-6 with circumferential effacement around CSF around the cord as well as flattening and deforming anterior surface of the cord on the left and disc herniation with spinal stenosis and narrowing of the left neural foramen at C6-7.  Circumferential effacement of CSF around the cord as well as flattening in deforming of the  anterior surface of the cord C6-7.  He was having shoulder pain and thought he was to have imaging on shoulders but had on neck. He never got results from the office about MRI and got a call to schedule with neurosurgery.     Shoulder pain- the same. No change.   He was having shoulder pain- intermittent- a few times weekly- happens every couple days. Anterior shoulder, shoulder joint, and superior shoulder. Previously MRI with partial rotator cuff tear and was told it would heal and no intervention necessary. Not sure if left or right shoulder- thinks left. Aching, stabbing, throbbing and burning pain- ranges about 4/10. Abduction worsens. Sometimes prevents from sleeping. Still intermittent numbness and tingling in bilateral hands. No pain, numbness, or tingling in arms. Not dropping things and no weakness.    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     Has been healthy.   Father with cancer- Prostate and thyroid cancer.   Brother with cancer- esophageal cancer from untreated GERD- Dx at 54 years- dx a couple months ago and went through radiation and is in a trial. He could have been on chemo as well.   PGM-  of lung cancer  PGF- no cancer.   Mother- healthy.   MGF-  of emphysema and thinks lung cancer  MGM-  of old age.      The following portions of the patient's history were reviewed and updated as appropriate: allergies, current medications, past family history, past medical history, past social history, past surgical history and problem list.    Review of Systems    Objective   Vitals:    24 1256   BP: 124/76   Pulse: 71   Resp: 16   Temp: 98.1 °F (36.7 °C)   SpO2: 97%     Body mass index is 29.06 kg/m².    Physical Exam  Vitals and nursing note reviewed.   Constitutional:       General: He is not in acute distress.     Appearance: Normal appearance. He is well-developed.   HENT:      Head: Normocephalic and atraumatic.      Right Ear: External ear normal.      Left Ear:  External ear normal.   Eyes:      Conjunctiva/sclera: Conjunctivae normal.   Neck:      Thyroid: No thyroid mass or thyromegaly.      Vascular: No carotid bruit.      Trachea: No tracheal deviation.   Cardiovascular:      Rate and Rhythm: Normal rate and regular rhythm.      Heart sounds: Normal heart sounds.   Pulmonary:      Effort: Pulmonary effort is normal.      Breath sounds: Normal breath sounds.   Musculoskeletal:      Right shoulder: No tenderness. Normal range of motion. Normal strength. Normal pulse.      Left shoulder: No tenderness. Normal range of motion. Normal strength. Normal pulse.      Right upper arm: No edema or tenderness.      Left upper arm: No edema or tenderness.      Right elbow: No swelling or effusion. Normal range of motion. No tenderness.      Left elbow: No swelling or effusion. Normal range of motion. No tenderness.      Right forearm: No swelling, deformity or tenderness.      Left forearm: No swelling, deformity or tenderness.      Right hand: Normal strength. Normal sensation.      Left hand: Normal strength. Normal sensation.      Cervical back: No deformity, lacerations, spasms or tenderness.   Skin:     General: Skin is warm and dry.   Neurological:      Mental Status: He is alert and oriented to person, place, and time.      Sensory: No sensory deficit.      Motor: No atrophy or abnormal muscle tone.      Gait: Gait normal.      Deep Tendon Reflexes:      Reflex Scores:       Bicep reflexes are 2+ on the right side and 2+ on the left side.       Brachioradialis reflexes are 2+ on the right side and 2+ on the left side.  Psychiatric:         Speech: Speech normal.         Behavior: Behavior normal.         Thought Content: Thought content normal.         Judgment: Judgment normal.         Assessment & Plan   Diagnoses and all orders for this visit:    1. Routine adult health maintenance (Primary)    2. Prostate cancer screening  -     PSA Screen    3. Mixed hyperlipidemia  -      Comprehensive Metabolic Panel  -     CK  -     Lipid Panel With LDL / HDL Ratio    4. Elevated fasting glucose  -     Comprehensive Metabolic Panel  -     Hemoglobin A1c  -     TSH  -     Urinalysis With Culture If Indicated -    5. Vitamin D deficiency  -     Comprehensive Metabolic Panel  -     Vitamin D,25-Hydroxy    6. Thrombocytosis  -     CBC & Differential    7. Leukocytosis, unspecified type  -     CBC & Differential  -     C-reactive Protein    8. Elevated C-reactive protein (CRP)    9. Gastroesophageal reflux disease, unspecified whether esophagitis present    10. Family history of esophageal cancer    11. DDD (degenerative disc disease), cervical    12. Herniated disc, cervical    13. Spinal stenosis, cervical region    14. Paresthesia of both hands    15. Chronic pain of both shoulders          Assessment and Plan  CPE completed today.  Patient is up to date on colonoscopy until 2029.  He is not up-to-date on vaccinations.  He should find out the date of his last tetanus to ensure less than 10 years.  He can call with the date for us to update vaccination record.  Patient to consider Shingrix, flu, and COVID vaccinations.  Preventative counseling to ensure healthy lifestyle, updated health maintenance, and continued follow-up as directed.    Patient will have fasting labs. Call if no results in 1 week. Stability of conditions, plan, follow up, and further recommendations pending labs.  Follow-up in 6 months to 1 year pending results.    Mixed hyperlipidemia- Await lab results for further recommendations.   Elevated fasting glucose- I will continue to monitor A1C annually.   Vitamin D deficiency- Dosing recommendations pending labs and his current dosage.   Thrombocytosis- I will monitor and refer to specialist if abnormal.   Leukocytosis- I will continue to monitor and refer to specialist if persistently abnormal.  He did quit smoking, so this may improve his WBC.  Elevated CRP- Negative autoimmune  testing 2023 other than elevated CRP.  I will recheck today.  GERD, family history of esophageal cancer in brother, history of colon polyps- I will continue Prilosec 20 mg daily. I referred to GI for evaluation and consideration of EGD.  Patient did not go for appointment.  He should consider seeing them as referred.  Bilateral shoulder pain- Patient with chronic bilateral shoulder pain, worse with abduction. He was told he had rotator cuff pathology in the past. I referred to sports medicine for evaluation, imaging as needed, and treatment.  He did not go for appointment.  He should consider seeing specialist if persistent, worsening, new or changing symptoms.  Cervical DDD, disc herniation, spinal stenosis- He was seen by previous PCP for shoulder pain and was thought to have cervical radiculopathy. He was referred to spine specialist. However, he did not go since he was not contacted about the results to determine treatment and was not having neck symptoms or interest in surgery. I have reviewed MRI. I referred to sports medicine for review and recommendations.  Patient did not go for appointment.  He should consider sports medicine if recurrence, worsening, new or changing symptoms.  Paresthesias, hands- Symptoms sound more like carpal tunnel syndrome. I asked that he touch his hands when numbness for better determination of etiology and treatment. He has no other radicular numbness or tingling in arms, so I have less concern for radiculopathy.  Consideration of additional workup if persisting, worsening, new or changing symptoms.      I spent 45 minutes caring for Tu Parikh on this date of service, including 15 minutes for CPE and 30 minutes for follow-up and problem focused visit. This time includes time spent by me in the following activities as necessary: preparing for the visit, reviewing tests, specialists records and previous visits, obtaining and/or reviewing a separately obtained history, performing  a medically appropriate exam and/or evaluation, counseling and educating the patient, family, caregiver, referring and/or communicating with other healthcare professionals, documenting information in the medical record, independently interpreting results and communicating that information with the patient, family, caregiver, and developing a medically appropriate treatment plan with consideration of other conditions, medications, and treatments.

## 2024-12-19 LAB
25(OH)D3+25(OH)D2 SERPL-MCNC: 27.7 NG/ML (ref 30–100)
ALBUMIN SERPL-MCNC: 4.2 G/DL (ref 4.1–5.1)
ALP SERPL-CCNC: 64 IU/L (ref 44–121)
ALT SERPL-CCNC: 19 IU/L (ref 0–44)
AST SERPL-CCNC: 17 IU/L (ref 0–40)
BASOPHILS # BLD AUTO: 0 X10E3/UL (ref 0–0.2)
BASOPHILS NFR BLD AUTO: 1 %
BILIRUB SERPL-MCNC: 0.4 MG/DL (ref 0–1.2)
BUN SERPL-MCNC: 12 MG/DL (ref 6–24)
BUN/CREAT SERPL: 10 (ref 9–20)
CALCIUM SERPL-MCNC: 9.9 MG/DL (ref 8.7–10.2)
CHLORIDE SERPL-SCNC: 101 MMOL/L (ref 96–106)
CHOLEST SERPL-MCNC: 210 MG/DL (ref 100–199)
CK SERPL-CCNC: 131 U/L (ref 49–439)
CO2 SERPL-SCNC: 22 MMOL/L (ref 20–29)
CREAT SERPL-MCNC: 1.26 MG/DL (ref 0.76–1.27)
CRP SERPL-MCNC: 1 MG/L (ref 0–10)
EGFRCR SERPLBLD CKD-EPI 2021: 69 ML/MIN/1.73
EOSINOPHIL # BLD AUTO: 0.2 X10E3/UL (ref 0–0.4)
EOSINOPHIL NFR BLD AUTO: 3 %
ERYTHROCYTE [DISTWIDTH] IN BLOOD BY AUTOMATED COUNT: 12.4 % (ref 11.6–15.4)
GLOBULIN SER CALC-MCNC: 3 G/DL (ref 1.5–4.5)
GLUCOSE SERPL-MCNC: 132 MG/DL (ref 70–99)
HBA1C MFR BLD: 5.8 % (ref 4.8–5.6)
HCT VFR BLD AUTO: 50.9 % (ref 37.5–51)
HDLC SERPL-MCNC: 39 MG/DL
HGB BLD-MCNC: 16.4 G/DL (ref 13–17.7)
IMM GRANULOCYTES # BLD AUTO: 0 X10E3/UL (ref 0–0.1)
IMM GRANULOCYTES NFR BLD AUTO: 0 %
LDLC SERPL CALC-MCNC: 129 MG/DL (ref 0–99)
LDLC/HDLC SERPL: 3.3 RATIO (ref 0–3.6)
LYMPHOCYTES # BLD AUTO: 2.1 X10E3/UL (ref 0.7–3.1)
LYMPHOCYTES NFR BLD AUTO: 31 %
MCH RBC QN AUTO: 28.5 PG (ref 26.6–33)
MCHC RBC AUTO-ENTMCNC: 32.2 G/DL (ref 31.5–35.7)
MCV RBC AUTO: 88 FL (ref 79–97)
MONOCYTES # BLD AUTO: 0.6 X10E3/UL (ref 0.1–0.9)
MONOCYTES NFR BLD AUTO: 9 %
NEUTROPHILS # BLD AUTO: 3.8 X10E3/UL (ref 1.4–7)
NEUTROPHILS NFR BLD AUTO: 56 %
PLATELET # BLD AUTO: 470 X10E3/UL (ref 150–450)
POTASSIUM SERPL-SCNC: 4.5 MMOL/L (ref 3.5–5.2)
PROT SERPL-MCNC: 7.2 G/DL (ref 6–8.5)
PSA SERPL-MCNC: 1.5 NG/ML (ref 0–4)
RBC # BLD AUTO: 5.76 X10E6/UL (ref 4.14–5.8)
SODIUM SERPL-SCNC: 139 MMOL/L (ref 134–144)
TRIGL SERPL-MCNC: 235 MG/DL (ref 0–149)
TSH SERPL DL<=0.005 MIU/L-ACNC: 1.37 UIU/ML (ref 0.45–4.5)
UNABLE TO VOID: NORMAL
VLDLC SERPL CALC-MCNC: 42 MG/DL (ref 5–40)
WBC # BLD AUTO: 6.7 X10E3/UL (ref 3.4–10.8)

## 2024-12-30 ENCOUNTER — TELEPHONE (OUTPATIENT)
Dept: FAMILY MEDICINE CLINIC | Facility: CLINIC | Age: 49
End: 2024-12-30
Payer: COMMERCIAL

## 2024-12-30 NOTE — TELEPHONE ENCOUNTER
Left message to call OK FOR HUB TO RELAY     platelets are elevated again but not as high as previous.     Glucose is elevated and A1C is in prediabetes range.     Cholesterol, bad cholesterol, and triglycerides are all elevated and good cholesterol is low. The 10-year risk of cardiovascular event is: 4.6%. Patient must stop sodas, juices, sweet tea, gatorade, sugary drinks, alcohol, sweets, sugars, and decrease fats, saturated fats, meat, dairy, oils, and starches- pasta, potatoes, bread. Ensure 45 minutes of exercise, 5-6 days per week. If no improvement, worsening at follow up, we will need to consider medication.     Vitamin D is low again. Please see how much vitamin D he is taking and if he has had any missed doses.      Please schedule follow up with me in 4 months, fasting.

## 2025-01-16 DIAGNOSIS — K21.9 GASTROESOPHAGEAL REFLUX DISEASE, UNSPECIFIED WHETHER ESOPHAGITIS PRESENT: ICD-10-CM

## 2025-01-16 DIAGNOSIS — Z80.0 FAMILY HISTORY OF ESOPHAGEAL CANCER: ICD-10-CM

## 2025-01-20 DIAGNOSIS — Z80.0 FAMILY HISTORY OF ESOPHAGEAL CANCER: ICD-10-CM

## 2025-01-20 DIAGNOSIS — K21.9 GASTROESOPHAGEAL REFLUX DISEASE, UNSPECIFIED WHETHER ESOPHAGITIS PRESENT: ICD-10-CM

## 2025-01-22 NOTE — TELEPHONE ENCOUNTER
See result note- please call him and get his appt scheduled for follow up with me the middle of April.

## 2025-02-20 ENCOUNTER — PATIENT MESSAGE (OUTPATIENT)
Dept: FAMILY MEDICINE CLINIC | Facility: CLINIC | Age: 50
End: 2025-02-20
Payer: COMMERCIAL

## 2025-02-20 DIAGNOSIS — Z80.0 FAMILY HISTORY OF ESOPHAGEAL CANCER: Primary | ICD-10-CM

## 2025-02-20 DIAGNOSIS — K21.9 GASTROESOPHAGEAL REFLUX DISEASE, UNSPECIFIED WHETHER ESOPHAGITIS PRESENT: ICD-10-CM

## 2025-02-28 ENCOUNTER — TELEPHONE (OUTPATIENT)
Dept: FAMILY MEDICINE CLINIC | Facility: CLINIC | Age: 50
End: 2025-02-28
Payer: COMMERCIAL

## 2025-02-28 NOTE — TELEPHONE ENCOUNTER
Appt for 3/12 was scheduled at a time we do not have appts. Rescheduled. Please verify that the new time is ok . LEFT MESSAGE FOR PATIENT OK FOR HUB TO RELAY

## 2025-03-12 ENCOUNTER — OFFICE VISIT (OUTPATIENT)
Dept: FAMILY MEDICINE CLINIC | Facility: CLINIC | Age: 50
End: 2025-03-12
Payer: COMMERCIAL

## 2025-03-12 VITALS
TEMPERATURE: 98.3 F | HEART RATE: 88 BPM | WEIGHT: 155 LBS | BODY MASS INDEX: 27.46 KG/M2 | DIASTOLIC BLOOD PRESSURE: 76 MMHG | OXYGEN SATURATION: 96 % | SYSTOLIC BLOOD PRESSURE: 118 MMHG | HEIGHT: 63 IN | RESPIRATION RATE: 12 BRPM

## 2025-03-12 DIAGNOSIS — D72.829 LEUKOCYTOSIS, UNSPECIFIED TYPE: ICD-10-CM

## 2025-03-12 DIAGNOSIS — R79.82 ELEVATED C-REACTIVE PROTEIN (CRP): ICD-10-CM

## 2025-03-12 DIAGNOSIS — M25.511 CHRONIC PAIN OF BOTH SHOULDERS: ICD-10-CM

## 2025-03-12 DIAGNOSIS — Z12.2 ENCOUNTER FOR SCREENING FOR LUNG CANCER: ICD-10-CM

## 2025-03-12 DIAGNOSIS — R73.03 PREDIABETES: ICD-10-CM

## 2025-03-12 DIAGNOSIS — D75.839 THROMBOCYTOSIS: ICD-10-CM

## 2025-03-12 DIAGNOSIS — R20.2 PARESTHESIA OF BOTH HANDS: ICD-10-CM

## 2025-03-12 DIAGNOSIS — M50.20 HERNIATED DISC, CERVICAL: ICD-10-CM

## 2025-03-12 DIAGNOSIS — Z12.11 ENCOUNTER FOR SCREENING FOR MALIGNANT NEOPLASM OF COLON: ICD-10-CM

## 2025-03-12 DIAGNOSIS — M48.02 SPINAL STENOSIS, CERVICAL REGION: ICD-10-CM

## 2025-03-12 DIAGNOSIS — E78.2 MIXED HYPERLIPIDEMIA: Primary | ICD-10-CM

## 2025-03-12 DIAGNOSIS — G89.29 CHRONIC PAIN OF BOTH SHOULDERS: ICD-10-CM

## 2025-03-12 DIAGNOSIS — M25.512 CHRONIC PAIN OF BOTH SHOULDERS: ICD-10-CM

## 2025-03-12 DIAGNOSIS — M50.30 DDD (DEGENERATIVE DISC DISEASE), CERVICAL: ICD-10-CM

## 2025-03-12 DIAGNOSIS — E55.9 VITAMIN D DEFICIENCY: ICD-10-CM

## 2025-03-12 NOTE — PROGRESS NOTES
Subjective   Tu Parikh is a 49 y.o. male who presents today in follow up of hyperlipidemia, elevated fasting glucose, vitamin D deficiency, thrombocytosis, GERD, family history of esophageal cancer, neck and shoulder pain.     Heartburn       Quit smoking 1 year ago. Smoked [age] years of age 1PPD    Father with colon cancer- dx about 60. Last colonoscopy - 2019- Dr Osman - 2 pseudopolyps- hyperplastic. States recheck in 10 years, however, with FHx, should be 5 years.     Mixed hyperlipidemia- on no medication  Elevated fasting glucose- normal A1C 2/2022.  A1c was up to 5.6% 9/2023.  Vitamin D deficiency- Taking  vitamin D- not sure the dosage- taking daily.   Thrombocytosis- no clots.   Leukocytosis- WBC increased at 11.07 2/2022.  Otherwise labs were normal. No signs of illness. No smoking- stopped about 1 year ago.   Elevated CRP-elevated CRP 9/2023 with otherwise negative autoimmune testing.    GERD- taking Prilosec and watching spicy foods. No breakthrough symptoms  TUMS- takes couple times weekly. Has vomiting with GERD. He has burning from stomach to throat. Brother advised he may need to check. Has taken Prilosec in the past. No prescription.   History of colon polyps- Last colonoscopy- 9/5/2019- pseudopolyps/ polyps following colitis. Advised recheck 10 years.     Neck pain- still has. No change.   Previous PCP SIVAN Mckeon with last appointment 2/2022 for cervical radiculopathy.  She was seen for CPE and reported chronic neck pain with new onset bilateral hand numbness and tingling.  She ordered labs and advised diet and exercise.    With no improvement, he underwent MRI cervical spine 3/2022 at [facility] with degenerative disc disease-mild narrowing left C3-4 neural foramen, mild narrowing of the C4-5 neural foramen, disc herniation with spinal stenosis and narrowing of the left neural foramen at C5-6 with circumferential effacement around CSF around the cord as well as flattening and  deforming anterior surface of the cord on the left and disc herniation with spinal stenosis and narrowing of the left neural foramen at C6-7.  Circumferential effacement of CSF around the cord as well as flattening in deforming of the anterior surface of the cord C6-7.  He was having shoulder pain and thought he was to have imaging on shoulders but had on neck. He never got results from the office about MRI and got a call to schedule with neurosurgery.     Shoulder pain- the same. No change.   He was having shoulder pain- intermittent- a few times weekly- happens every couple days. Anterior shoulder, shoulder joint, and superior shoulder. Previously MRI with partial rotator cuff tear and was told it would heal and no intervention necessary. Not sure if left or right shoulder- thinks left. Aching, stabbing, throbbing and burning pain- ranges about 4/10. Abduction worsens. Sometimes prevents from sleeping. Still intermittent numbness and tingling in bilateral hands. No pain, numbness, or tingling in arms. Not dropping things and no weakness.    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     Has been healthy.   Father with cancer- Prostate and thyroid cancer.   Brother with cancer- esophageal cancer from untreated GERD- Dx at 54 years- dx a couple months ago and went through radiation and is in a trial. He could have been on chemo as well.   PGM-  of lung cancer  PGF- no cancer.   Mother- healthy.   MGF-  of emphysema and thinks lung cancer  MGM-  of old age.      The following portions of the patient's history were reviewed and updated as appropriate: allergies, current medications, past family history, past medical history, past social history, past surgical history and problem list.    Review of Systems    Objective   Vitals:    25 1442   BP: 118/76   Pulse: 88   Resp: 12   Temp: 98.3 °F (36.8 °C)   SpO2: 96%     Body mass index is 27.46 kg/m².    Physical Exam  Vitals and nursing  note reviewed.   Constitutional:       General: He is not in acute distress.     Appearance: Normal appearance. He is well-developed.   HENT:      Head: Normocephalic and atraumatic.      Right Ear: External ear normal.      Left Ear: External ear normal.   Eyes:      Conjunctiva/sclera: Conjunctivae normal.   Neck:      Thyroid: No thyroid mass or thyromegaly.      Vascular: No carotid bruit.      Trachea: No tracheal deviation.   Cardiovascular:      Rate and Rhythm: Normal rate and regular rhythm.      Heart sounds: Normal heart sounds.   Pulmonary:      Effort: Pulmonary effort is normal.      Breath sounds: Normal breath sounds.   Musculoskeletal:      Right shoulder: No tenderness. Normal range of motion. Normal strength. Normal pulse.      Left shoulder: No tenderness. Normal range of motion. Normal strength. Normal pulse.      Right upper arm: No edema or tenderness.      Left upper arm: No edema or tenderness.      Right elbow: No swelling or effusion. Normal range of motion. No tenderness.      Left elbow: No swelling or effusion. Normal range of motion. No tenderness.      Right forearm: No swelling, deformity or tenderness.      Left forearm: No swelling, deformity or tenderness.      Right hand: Normal strength. Normal sensation.      Left hand: Normal strength. Normal sensation.      Cervical back: No deformity, lacerations, spasms or tenderness.   Skin:     General: Skin is warm and dry.   Neurological:      Mental Status: He is alert and oriented to person, place, and time.      Sensory: No sensory deficit.      Motor: No atrophy or abnormal muscle tone.      Gait: Gait normal.      Deep Tendon Reflexes:      Reflex Scores:       Bicep reflexes are 2+ on the right side and 2+ on the left side.       Brachioradialis reflexes are 2+ on the right side and 2+ on the left side.  Psychiatric:         Speech: Speech normal.         Behavior: Behavior normal.         Thought Content: Thought content normal.          Judgment: Judgment normal.         Assessment & Plan   Diagnoses and all orders for this visit:    1. Mixed hyperlipidemia (Primary)  -     Comprehensive Metabolic Panel  -     CK  -     Lipid Panel With LDL / HDL Ratio    2. Encounter for screening for malignant neoplasm of colon  -     Ambulatory Referral For Screening Colonoscopy    3. Encounter for screening for lung cancer  -      CT Chest Low Dose Cancer Screening WO; Future    4. Prediabetes  -     Comprehensive Metabolic Panel  -     Hemoglobin A1c    5. Vitamin D deficiency  -     Comprehensive Metabolic Panel  -     Vitamin D,25-Hydroxy    6. Thrombocytosis  -     CBC & Differential    7. Leukocytosis, unspecified type  -     CBC & Differential    8. Elevated C-reactive protein (CRP)    9. DDD (degenerative disc disease), cervical    10. Herniated disc, cervical    11. Spinal stenosis, cervical region    12. Paresthesia of both hands    13. Chronic pain of both shoulders          Assessment and Plan  CPE completed today.  Patient is up to date on colonoscopy until 2029.  He is not up-to-date on vaccinations.  He should find out the date of his last tetanus to ensure less than 10 years.  He can call with the date for us to update vaccination record.  Patient to consider Shingrix, flu, and COVID vaccinations.  Preventative counseling to ensure healthy lifestyle, updated health maintenance, and continued follow-up as directed.    Patient will have fasting labs. Call if no results in 1 week. Stability of conditions, plan, follow up, and further recommendations pending labs.  Follow-up in 6 months to 1 year pending results.    Mixed hyperlipidemia- Await lab results for further recommendations.   Elevated fasting glucose- I will continue to monitor A1C annually.   Vitamin D deficiency- Dosing recommendations pending labs and his current dosage.   Thrombocytosis- I will monitor and refer to specialist if abnormal.   Leukocytosis- I will continue to  monitor and refer to specialist if persistently abnormal.  He did quit smoking, so this may improve his WBC.  Elevated CRP- Negative autoimmune testing 2023 other than elevated CRP.  I will recheck today.  GERD, family history of esophageal cancer in brother, history of colon polyps- I will continue Prilosec 20 mg daily. I referred to GI for evaluation and consideration of EGD.  Patient did not go for appointment.  He should consider seeing them as referred.  Bilateral shoulder pain- Patient with chronic bilateral shoulder pain, worse with abduction. He was told he had rotator cuff pathology in the past. I referred to sports medicine for evaluation, imaging as needed, and treatment.  He did not go for appointment.  He should consider seeing specialist if persistent, worsening, new or changing symptoms.  Cervical DDD, disc herniation, spinal stenosis- He was seen by previous PCP for shoulder pain and was thought to have cervical radiculopathy. He was referred to spine specialist. However, he did not go since he was not contacted about the results to determine treatment and was not having neck symptoms or interest in surgery. I have reviewed MRI. I referred to sports medicine for review and recommendations.  Patient did not go for appointment.  He should consider sports medicine if recurrence, worsening, new or changing symptoms.  Paresthesias, hands- Symptoms sound more like carpal tunnel syndrome. I asked that he touch his hands when numbness for better determination of etiology and treatment. He has no other radicular numbness or tingling in arms, so I have less concern for radiculopathy.  Consideration of additional workup if persisting, worsening, new or changing symptoms.      I spent 45 minutes caring for Tu Parikh on this date of service, including 15 minutes for CPE and 30 minutes for follow-up and problem focused visit. This time includes time spent by me in the following activities as necessary:  preparing for the visit, reviewing tests, specialists records and previous visits, obtaining and/or reviewing a separately obtained history, performing a medically appropriate exam and/or evaluation, counseling and educating the patient, family, caregiver, referring and/or communicating with other healthcare professionals, documenting information in the medical record, independently interpreting results and communicating that information with the patient, family, caregiver, and developing a medically appropriate treatment plan with consideration of other conditions, medications, and treatments.

## 2025-03-13 LAB
25(OH)D3+25(OH)D2 SERPL-MCNC: 44.3 NG/ML (ref 30–100)
ALBUMIN SERPL-MCNC: 4.3 G/DL (ref 4.1–5.1)
ALP SERPL-CCNC: 78 IU/L (ref 44–121)
ALT SERPL-CCNC: 18 IU/L (ref 0–44)
AST SERPL-CCNC: 13 IU/L (ref 0–40)
BASOPHILS # BLD AUTO: 0 X10E3/UL (ref 0–0.2)
BASOPHILS NFR BLD AUTO: 0 %
BILIRUB SERPL-MCNC: 0.3 MG/DL (ref 0–1.2)
BUN SERPL-MCNC: 7 MG/DL (ref 6–24)
BUN/CREAT SERPL: 6 (ref 9–20)
CALCIUM SERPL-MCNC: 9.6 MG/DL (ref 8.7–10.2)
CHLORIDE SERPL-SCNC: 99 MMOL/L (ref 96–106)
CHOLEST SERPL-MCNC: 185 MG/DL (ref 100–199)
CK SERPL-CCNC: 64 U/L (ref 49–439)
CO2 SERPL-SCNC: 24 MMOL/L (ref 20–29)
CREAT SERPL-MCNC: 1.26 MG/DL (ref 0.76–1.27)
EGFRCR SERPLBLD CKD-EPI 2021: 69 ML/MIN/1.73
EOSINOPHIL # BLD AUTO: 0 X10E3/UL (ref 0–0.4)
EOSINOPHIL NFR BLD AUTO: 0 %
ERYTHROCYTE [DISTWIDTH] IN BLOOD BY AUTOMATED COUNT: 13.6 % (ref 11.6–15.4)
GLOBULIN SER CALC-MCNC: 3 G/DL (ref 1.5–4.5)
GLUCOSE SERPL-MCNC: 91 MG/DL (ref 70–99)
HBA1C MFR BLD: 5.7 % (ref 4.8–5.6)
HCT VFR BLD AUTO: 52 % (ref 37.5–51)
HDLC SERPL-MCNC: 39 MG/DL
HGB BLD-MCNC: 16.5 G/DL (ref 13–17.7)
IMM GRANULOCYTES # BLD AUTO: 0 X10E3/UL (ref 0–0.1)
IMM GRANULOCYTES NFR BLD AUTO: 0 %
LDLC SERPL CALC-MCNC: 112 MG/DL (ref 0–99)
LDLC/HDLC SERPL: 2.9 RATIO (ref 0–3.6)
LYMPHOCYTES # BLD AUTO: 0.7 X10E3/UL (ref 0.7–3.1)
LYMPHOCYTES NFR BLD AUTO: 7 %
MCH RBC QN AUTO: 28.8 PG (ref 26.6–33)
MCHC RBC AUTO-ENTMCNC: 31.7 G/DL (ref 31.5–35.7)
MCV RBC AUTO: 91 FL (ref 79–97)
MONOCYTES # BLD AUTO: 0.7 X10E3/UL (ref 0.1–0.9)
MONOCYTES NFR BLD AUTO: 7 %
NEUTROPHILS # BLD AUTO: 8.2 X10E3/UL (ref 1.4–7)
NEUTROPHILS NFR BLD AUTO: 86 %
PLATELET # BLD AUTO: 413 X10E3/UL (ref 150–450)
POTASSIUM SERPL-SCNC: 4.6 MMOL/L (ref 3.5–5.2)
PROT SERPL-MCNC: 7.3 G/DL (ref 6–8.5)
RBC # BLD AUTO: 5.73 X10E6/UL (ref 4.14–5.8)
SODIUM SERPL-SCNC: 136 MMOL/L (ref 134–144)
TRIGL SERPL-MCNC: 196 MG/DL (ref 0–149)
VLDLC SERPL CALC-MCNC: 34 MG/DL (ref 5–40)
WBC # BLD AUTO: 9.6 X10E3/UL (ref 3.4–10.8)

## 2025-03-21 ENCOUNTER — OFFICE VISIT (OUTPATIENT)
Dept: GASTROENTEROLOGY | Facility: CLINIC | Age: 50
End: 2025-03-21
Payer: COMMERCIAL

## 2025-03-21 ENCOUNTER — TELEPHONE (OUTPATIENT)
Dept: GASTROENTEROLOGY | Facility: CLINIC | Age: 50
End: 2025-03-21

## 2025-03-21 VITALS
BODY MASS INDEX: 26.93 KG/M2 | HEART RATE: 87 BPM | DIASTOLIC BLOOD PRESSURE: 78 MMHG | SYSTOLIC BLOOD PRESSURE: 116 MMHG | WEIGHT: 152 LBS | HEIGHT: 63 IN | TEMPERATURE: 98.2 F

## 2025-03-21 DIAGNOSIS — K21.9 GASTROESOPHAGEAL REFLUX DISEASE, UNSPECIFIED WHETHER ESOPHAGITIS PRESENT: ICD-10-CM

## 2025-03-21 DIAGNOSIS — K52.9 COLITIS: Primary | ICD-10-CM

## 2025-03-21 PROCEDURE — 99204 OFFICE O/P NEW MOD 45 MIN: CPT | Performed by: INTERNAL MEDICINE

## 2025-03-21 RX ORDER — SODIUM CHLORIDE, SODIUM LACTATE, POTASSIUM CHLORIDE, CALCIUM CHLORIDE 600; 310; 30; 20 MG/100ML; MG/100ML; MG/100ML; MG/100ML
30 INJECTION, SOLUTION INTRAVENOUS CONTINUOUS
OUTPATIENT
Start: 2025-03-21 | End: 2025-03-22

## 2025-03-21 NOTE — PROGRESS NOTES
Chief Complaint   Patient presents with    Heartburn     Subjective   HPI    Tu Parikh is a 49 y.o. male who presents today for new patient evaluation.   Referred to discuss need for screening EGD.     Brother with esophageal cancer  GERD for years  Just starred omeprazole last few years  No dysphagia  No prior EGD    [facility]  Post-op Diagnosis: - Multiple pseudopolyps in the proximal transverse  colon and at the hepatic flexure, 2 removed with a cold  biopsy forceps. 2 were Resected and retrieved.     No lower GI sx    Objective   Vitals:    03/21/25 1113   BP: 116/78   Pulse: 87   Temp: 98.2 °F (36.8 °C)     Physical Exam  Vitals reviewed.   Constitutional:       Appearance: He is well-developed.   HENT:      Head: Normocephalic and atraumatic.   Neurological:      Mental Status: He is alert and oriented to person, place, and time.   Psychiatric:         Behavior: Behavior normal.         Thought Content: Thought content normal.         Judgment: Judgment normal.       The following data was reviewed by: Adiel Venegas MD on 03/21/2025:  Common labs          12/18/2024    13:41 3/12/2025    15:22   Common Labs   Glucose 132  91    BUN 12  7    Creatinine 1.26  1.26    Sodium 139  136    Potassium 4.5  4.6    Chloride 101  99    Calcium 9.9  9.6    Albumin 4.2  4.3    Total Bilirubin 0.4  0.3    Alkaline Phosphatase 64  78    AST (SGOT) 17  13    ALT (SGPT) 19  18    WBC 6.7  9.6    Hemoglobin 16.4  16.5    Hematocrit 50.9  52.0    Platelets 470  413    Total Cholesterol 210  185    Triglycerides 235  196    HDL Cholesterol 39  39    LDL Cholesterol  129  112    Hemoglobin A1C 5.8  5.7    PSA 1.5          Assessment & Plan   Assessment:     1. Colitis    2. Gastroesophageal reflux disease, unspecified whether esophagitis present      Plan:   Schedule screening EGD given long standing hx of GERD and family hx of esophageal CA  Schedule colonoscopy given hx of pseudopolyps on prior  colonoscopy, ? Hx of colitis.  Continue daily PPI          Adiel Venegas M.D.  [facility]  [address] [place] [address]  Office: [phone]

## 2025-03-21 NOTE — TELEPHONE ENCOUNTER
LUCY Delgado for COLONOSCOPY on 6/5/25  arrive at 6:30 . Gave prep instructions to pt in office ....miralax

## 2025-06-05 ENCOUNTER — ANESTHESIA EVENT (OUTPATIENT)
Dept: GASTROENTEROLOGY | Facility: HOSPITAL | Age: 50
End: 2025-06-05
Payer: COMMERCIAL

## 2025-06-05 ENCOUNTER — HOSPITAL ENCOUNTER (OUTPATIENT)
Facility: HOSPITAL | Age: 50
Setting detail: HOSPITAL OUTPATIENT SURGERY
Discharge: HOME | End: 2025-06-05
Attending: INTERNAL MEDICINE | Admitting: INTERNAL MEDICINE
Payer: COMMERCIAL

## 2025-06-05 ENCOUNTER — ANESTHESIA (OUTPATIENT)
Dept: GASTROENTEROLOGY | Facility: HOSPITAL | Age: 50
End: 2025-06-05
Payer: COMMERCIAL

## 2025-06-05 VITALS
BODY MASS INDEX: 23.37 KG/M2 | DIASTOLIC BLOOD PRESSURE: 86 MMHG | RESPIRATION RATE: 14 BRPM | HEART RATE: 83 BPM | OXYGEN SATURATION: 98 % | WEIGHT: 136.9 LBS | SYSTOLIC BLOOD PRESSURE: 128 MMHG | HEIGHT: 64 IN

## 2025-06-05 DIAGNOSIS — K21.9 GASTROESOPHAGEAL REFLUX DISEASE, UNSPECIFIED WHETHER ESOPHAGITIS PRESENT: ICD-10-CM

## 2025-06-05 DIAGNOSIS — K52.9 COLITIS: ICD-10-CM

## 2025-06-05 PROCEDURE — 43239 EGD BIOPSY SINGLE/MULTIPLE: CPT | Performed by: INTERNAL MEDICINE

## 2025-06-05 PROCEDURE — 25010000002 LIDOCAINE 2% SOLUTION: Performed by: NURSE ANESTHETIST, CERTIFIED REGISTERED

## 2025-06-05 PROCEDURE — 25810000003 LACTATED RINGERS PER 1000 ML: Performed by: INTERNAL MEDICINE

## 2025-06-05 PROCEDURE — 25010000002 GLYCOPYRROLATE 0.2 MG/ML SOLUTION: Performed by: NURSE ANESTHETIST, CERTIFIED REGISTERED

## 2025-06-05 PROCEDURE — 45380 COLONOSCOPY AND BIOPSY: CPT | Performed by: INTERNAL MEDICINE

## 2025-06-05 PROCEDURE — 25010000002 PROPOFOL 10 MG/ML EMULSION: Performed by: NURSE ANESTHETIST, CERTIFIED REGISTERED

## 2025-06-05 PROCEDURE — 45385 COLONOSCOPY W/LESION REMOVAL: CPT | Performed by: INTERNAL MEDICINE

## 2025-06-05 RX ORDER — LIDOCAINE HYDROCHLORIDE 20 MG/ML
INJECTION, SOLUTION INFILTRATION; PERINEURAL AS NEEDED
Status: DISCONTINUED | OUTPATIENT
Start: 2025-06-05 | End: 2025-06-05 | Stop reason: SURG

## 2025-06-05 RX ORDER — SODIUM CHLORIDE, SODIUM LACTATE, POTASSIUM CHLORIDE, CALCIUM CHLORIDE 600; 310; 30; 20 MG/100ML; MG/100ML; MG/100ML; MG/100ML
30 INJECTION, SOLUTION INTRAVENOUS CONTINUOUS
Status: DISCONTINUED | OUTPATIENT
Start: 2025-06-05 | End: 2025-06-05 | Stop reason: HOSPADM

## 2025-06-05 RX ORDER — PROPOFOL 10 MG/ML
VIAL (ML) INTRAVENOUS AS NEEDED
Status: DISCONTINUED | OUTPATIENT
Start: 2025-06-05 | End: 2025-06-05 | Stop reason: SURG

## 2025-06-05 RX ORDER — GLYCOPYRROLATE 0.2 MG/ML
INJECTION INTRAMUSCULAR; INTRAVENOUS AS NEEDED
Status: DISCONTINUED | OUTPATIENT
Start: 2025-06-05 | End: 2025-06-05 | Stop reason: SURG

## 2025-06-05 RX ADMIN — PROPOFOL 100 MG: 10 INJECTION, EMULSION INTRAVENOUS at 08:02

## 2025-06-05 RX ADMIN — LIDOCAINE HYDROCHLORIDE 80 MG: 20 INJECTION, SOLUTION INFILTRATION; PERINEURAL at 08:02

## 2025-06-05 RX ADMIN — SODIUM CHLORIDE, POTASSIUM CHLORIDE, SODIUM LACTATE AND CALCIUM CHLORIDE 30 ML/HR: 600; 310; 30; 20 INJECTION, SOLUTION INTRAVENOUS at 07:53

## 2025-06-05 RX ADMIN — GLYCOPYRROLATE 0.1 MG: 0.2 INJECTION INTRAMUSCULAR; INTRAVENOUS at 08:00

## 2025-06-05 RX ADMIN — PROPOFOL 50 MG: 10 INJECTION, EMULSION INTRAVENOUS at 08:06

## 2025-06-05 RX ADMIN — PROPOFOL 140 MCG/KG/MIN: 10 INJECTION, EMULSION INTRAVENOUS at 08:02

## 2025-06-05 NOTE — H&P
"[facility]  Pre Procedure History & Physical    Chief Complaint:   Family hx of esophageal cancer  Hx of colonic pseudopolyps    Subjective     HPI:   /49 y.o. male here for EGD/colon for above issues    Past Medical History:   Past Medical History:   Diagnosis Date    Arthritis     Cervical radiculopathy     Colon polyp 2021    Wasnt aware until appt 3/2025    GERD (gastroesophageal reflux disease) 2 years    Prediabetes     Snores     Ulcerative colitis 2021    Acute (UNSURE OF THIS DX)       Past Surgical History:  Past Surgical History:   Procedure Laterality Date    COLONOSCOPY  2021    EYE SURGERY  2015    Lasik    VASECTOMY  2012       Family History:  Family History   Problem Relation Age of Onset    No Known Problems Mother     Cancer Father     Colon cancer Father     Cancer Brother     Esophageal cancer Brother     Crohn's disease Maternal Aunt     Ulcerative colitis Maternal Grandfather     Malig Hyperthermia Neg Hx        Social History:   reports that he has been smoking cigarettes. He started smoking about 37 years ago. He has a 71 pack-year smoking history. He has been exposed to tobacco smoke. He has never used smokeless tobacco. He reports current alcohol use of about 2.0 standard drinks of alcohol per week. He reports that he does not use drugs.    Medications:   Medications Prior to Admission   Medication Sig Dispense Refill Last Dose/Taking    omeprazole (priLOSEC) 20 MG capsule Take 1 capsule by mouth Daily. 90 capsule 1 Past Week       Allergies:  Patient has no known allergies.    ROS:    Pertinent items are noted in HPI     Objective     Blood pressure 125/79, pulse 84, resp. rate 16, height 162.6 cm (64\"), weight 62.1 kg (136 lb 14.4 oz), SpO2 97%.    Physical Exam   Constitutional: Pt is oriented to person, place, and time and well-developed, well-nourished, and in no distress.   Mouth/Throat: Oropharynx is clear and moist.   Neck: Normal range of motion. "   Cardiovascular: Normal rate, regular rhythm and normal heart sounds.    Pulmonary/Chest: Effort normal and breath sounds normal.   Abdominal: Soft. Nontender  Skin: Skin is warm and dry.   Psychiatric: Mood, memory, affect and judgment normal.     Assessment & Plan     Diagnosis:  Family hx of esophageal CA  Hx of colonic pseudopolyps    Anticipated Surgical Procedure:  EGD  Colonoscopy    The risks, benefits, and alternatives of this procedure have been discussed with the patient or the responsible party- the patient understands and agrees to proceed.

## 2025-06-05 NOTE — DISCHARGE INSTRUCTIONS
For the next 24 hours patient needs to be with a responsible adult.    For 24 hours DO NOT drive, operate machinery, appliances, drink alcohol, make important decisions or sign legal documents.    Start with a light or bland diet if you are feeling sick to your stomach otherwise advance to regular diet as tolerated.    Follow recommendations on procedure report if provided by your doctor.    Call Dr Venegas for problems [phone]. Await pathology result in 7-10 days.  Increase your Omeprazole dose to 40 mg a day as instructed by Dr. Venegas.    Problems may include but not limited to: large amounts of bleeding, trouble breathing, repeated vomiting, severe unrelieved pain, fever or chills.

## 2025-06-05 NOTE — ANESTHESIA PREPROCEDURE EVALUATION
Anesthesia Evaluation     Patient summary reviewed   no history of anesthetic complications:   NPO Solid Status: > 8 hours  NPO Liquid Status: > 2 hours           Airway   Mallampati: II  TM distance: >3 FB  Neck ROM: full  No difficulty expected  Dental      Pulmonary     breath sounds clear to auscultation  (+) a smoker Current,  (-) shortness of breath, recent URISleep apnea: Snores, no dx.  Cardiovascular   Exercise tolerance: good (4-7 METS)    Rhythm: regular  Rate: normal    (-) past MI, dysrhythmias, angina      Neuro/Psych  (+) numbness  (-) seizures, CVA  GI/Hepatic/Renal/Endo    (+) GERDDiabetes: IFG.    ROS Comment:    - potential UC    Musculoskeletal     (+) neck pain, radiculopathy  (-) neck stiffness  Abdominal    Substance History      OB/GYN          Other   arthritis,                   Anesthesia Plan    ASA 2     MAC     (MAC anesthesia discussed with patient and/or patient representative. Risks (including but not limited to intra-op awareness), benefits, and alternatives were discussed. Understanding was voiced with an agreement to proceed with a MAC technique and General as a backup option. )    Anesthetic plan, risks, benefits, and alternatives have been provided, discussed and informed consent has been obtained with: patient.    CODE STATUS:

## 2025-06-05 NOTE — ANESTHESIA POSTPROCEDURE EVALUATION
"Patient: Tu Parikh    Procedure Summary       Date: 06/05/25 Room / Location: [facility] ENDOSCOPY 10 /  [facility]    Anesthesia Start: 0758 Anesthesia Stop: 0834    Procedures:       ESOPHAGOGASTRODUODENOSCOPY WITH BIOPSIES (Esophagus)      COLONOSCOPY INTO CECUM AND TI WITH BIOPSIES, POLYPECTOMIES (COLD SNARE) Diagnosis:       Colitis      Gastroesophageal reflux disease, unspecified whether esophagitis present      (Colitis [K52.9])      (Gastroesophageal reflux disease, unspecified whether esophagitis present [K21.9])    Surgeons: Adiel Venegas MD Provider: Renzo Anaya DO    Anesthesia Type: MAC ASA Status: 2            Anesthesia Type: MAC    Vitals  Vitals Value Taken Time   /86 06/05/25 08:51   Temp     Pulse 83 06/05/25 08:51   Resp 14 06/05/25 08:51   SpO2 98 % 06/05/25 08:51           Post Anesthesia Care and Evaluation    Patient location during evaluation: bedside  Patient participation: complete - patient participated  Level of consciousness: awake and alert  Pain management: adequate    Airway patency: patent  Anesthetic complications: No anesthetic complications  PONV Status: controlled  Cardiovascular status: acceptable and hemodynamically stable  Respiratory status: acceptable, spontaneous ventilation and nonlabored ventilation  Hydration status: acceptable    Comments: /86 (BP Location: Left arm, Patient Position: Lying)   Pulse 83   Resp 14   Ht 162.6 cm (64\")   Wt 62.1 kg (136 lb 14.4 oz)   SpO2 98%   BMI 23.50 kg/m²       "

## 2025-06-06 LAB
CYTO UR: NORMAL
LAB AP CASE REPORT: NORMAL
PATH REPORT.ADDENDUM SPEC: NORMAL
PATH REPORT.FINAL DX SPEC: NORMAL
PATH REPORT.GROSS SPEC: NORMAL

## 2025-07-08 DIAGNOSIS — K21.9 GASTROESOPHAGEAL REFLUX DISEASE, UNSPECIFIED WHETHER ESOPHAGITIS PRESENT: ICD-10-CM

## 2025-07-08 DIAGNOSIS — Z80.0 FAMILY HISTORY OF ESOPHAGEAL CANCER: ICD-10-CM

## 2025-07-09 RX ORDER — OMEPRAZOLE 20 MG/1
20 CAPSULE, DELAYED RELEASE ORAL DAILY
Qty: 30 CAPSULE | Refills: 0 | Status: SHIPPED | OUTPATIENT
Start: 2025-07-09

## 2025-07-25 DIAGNOSIS — K21.9 GASTROESOPHAGEAL REFLUX DISEASE, UNSPECIFIED WHETHER ESOPHAGITIS PRESENT: ICD-10-CM

## 2025-07-25 DIAGNOSIS — Z80.0 FAMILY HISTORY OF ESOPHAGEAL CANCER: ICD-10-CM

## 2025-07-25 RX ORDER — OMEPRAZOLE 20 MG/1
20 CAPSULE, DELAYED RELEASE ORAL DAILY
Qty: 30 CAPSULE | Refills: 0 | Status: SHIPPED | OUTPATIENT
Start: 2025-07-25

## 2025-07-25 NOTE — TELEPHONE ENCOUNTER
See result note and previous message.  Please get the patient scheduled for follow-up with me ASAP.    Me to Jorge A [facility] 7/9/25 10:26 AM Note    See result note- patient is overdue for follow up. Please schedule him for follow up with me

## 2025-08-08 RX ORDER — MESALAMINE 1.2 G/1
4800 TABLET, DELAYED RELEASE ORAL
Qty: 360 TABLET | Refills: 3 | OUTPATIENT
Start: 2025-08-08

## 2025-08-14 DIAGNOSIS — K21.9 GASTROESOPHAGEAL REFLUX DISEASE, UNSPECIFIED WHETHER ESOPHAGITIS PRESENT: ICD-10-CM

## 2025-08-14 DIAGNOSIS — Z80.0 FAMILY HISTORY OF ESOPHAGEAL CANCER: ICD-10-CM

## 2025-08-14 RX ORDER — OMEPRAZOLE 20 MG/1
40 CAPSULE, DELAYED RELEASE ORAL DAILY
Qty: 30 CAPSULE | Refills: 3 | Status: SHIPPED | OUTPATIENT
Start: 2025-08-14

## 2025-08-20 ENCOUNTER — PATIENT MESSAGE (OUTPATIENT)
Dept: GASTROENTEROLOGY | Facility: CLINIC | Age: 50
End: 2025-08-20
Payer: COMMERCIAL

## (undated) DEVICE — TUBING, SUCTION, 1/4" X 10', STRAIGHT: Brand: MEDLINE

## (undated) DEVICE — LN SMPL CO2 SHTRM SD STREAM W/M LUER

## (undated) DEVICE — THE SINGLE USE ETRAP – POLYP TRAP IS USED FOR SUCTION RETRIEVAL OF ENDOSCOPICALLY REMOVED POLYPS.: Brand: ETRAP

## (undated) DEVICE — FRCP BX RADJAW4 NDL 2.8 240CM LG OG BX40

## (undated) DEVICE — ADAPT CLN BIOGUARD AIR/H2O DISP

## (undated) DEVICE — BLCK/BITE BLOX W/DENTL/RIM W/STRAP 54F

## (undated) DEVICE — KT ORCA ORCAPOD DISP STRL

## (undated) DEVICE — SENSR O2 OXIMAX FNGR A/ 18IN NONSTR

## (undated) DEVICE — LASSO POLYPECTOMY SNARE: Brand: LASSO

## (undated) DEVICE — MSK PROC CURAPLEX O2 2/ADAPT 7FT